# Patient Record
Sex: FEMALE | Race: BLACK OR AFRICAN AMERICAN | Employment: FULL TIME | URBAN - METROPOLITAN AREA
[De-identification: names, ages, dates, MRNs, and addresses within clinical notes are randomized per-mention and may not be internally consistent; named-entity substitution may affect disease eponyms.]

---

## 2021-06-23 ENCOUNTER — HOSPITAL ENCOUNTER (OUTPATIENT)
Dept: NUTRITION | Age: 21
Discharge: HOME | End: 2021-06-23
Payer: MEDICAID

## 2021-06-23 PROCEDURE — 97802 MEDICAL NUTRITION INDIV IN: CPT

## 2021-06-23 NOTE — PROGRESS NOTES
[address] [place] [address]  [address] [facility], [id number] [place] [address]  Phone: [phone] Fax: [phone]   Nutrition Assessment  Medical Nutrition Therapy   Outpatient Initial Evaluation         Patient Name: Ashanti Callahan : 2000   Treatment Diagnosis: Morbid Obesity due to excess calories, BMI 40-44.9    Referral Source: Dwight Rodriguez* Start of Care [facility]): 2021     Gender: female Age: 22 y.o. Ht: 67 in Wt:  260 lb  kg   BMI: 40.7 BMR   Male  BMR Female 0     Past Medical History:  High Blood Pressure     Pertinent Medications:   Includes: [place]      Biochemical Data:        Assessment:   Patient is present today to learn about nutrition for weight loss. Patient reports that she has gained around 100 pounds over the past 2 years. Patient is scheduled to have bariatric surgery in about 6 months and is required to see RD 1 time per month. Patient believes that her weight gain began around the time she met her fiance. Patient was consuming more food and eating out at restaurants more frequently. Patient does not have an exercise routine. Food & Nutrition: 24 Hour Recall:  Breakfast: skips  Lunch: pizza with mushrooms and steak   Dinner: same as lunch   Drinks: water, juice   Snacks: vanilla wafers, chips, cakes        Estimate Needs   Calories: 1800  Protein: 113 Carbs: 203 Fat: 60   Kcal/day  g/day  g/day  g/day        percent: 25  45  30               Nutrition Diagnosis Obesity related to excessive carbohydrate intake as evidenced by 24 hour recall of carbohydrate dense foods (pizza, cakes, cookies, juice). Nutrition Intervention &  Education: Educated patient on the need for a consistent carbohydrate intake related to weight loss. Educated patient on nutrition label reading, emphasizing carbohydrates and serving size.  Educated patient on protein sources, encouraged patient to incorporate mostly lean protein source with all carbohydrates. Encouraged patient to exercise 150 minutes per week. Handouts Provided: [x]  Carbohydrates  [x]  Protein  [x]  Non-starchy Vegatbles  [x]  Food Label  [x]  Meal and Snack Ideas  []  Food Journals []  Diabetes  []  Cholesterol  []  Sodium  [x]  Gen Nutr Guidelines  []  SBGM Guidelines  []  Others:   Information Reviewed with: Patient    Readiness to Change Stage: []  Pre-contemplative    [x]  Contemplative  []  Preparation               []  Action                  []  Maintenance   Potential Barriers to Learning: []  Decline in memory    []  Language barrier   []  Other:  []  Emotional                  []  Limited mobility  [x]  Lack of motivation     [] Vision, hearing or cognitive impairment   Expected Compliance: Fair      Nutritional Goal - To promote lifestyle changes to result in:    [x]  Weight loss  []  Improved diabetic control  []  Decreased cholesterol levels  []  Decreased blood pressure  []  Weight maintenance []  Preventing any interactions associated with food allergies  []  Adequate weight gain toward goal weight  []  Other:        Patient Goals:   1. Patient will consume three meals per day 4-5 hours apart. 2. Patient will include a protein source at all meals and snacks. 3. Patient will walk dog for 45 minutes at least 4 days per week.       Dietitian Signature: Iram Segura RD Date: 6/23/2021   Follow-up: 7-19-21 @ 1:30 Time: 2:57 PM

## 2021-07-19 ENCOUNTER — HOSPITAL ENCOUNTER (OUTPATIENT)
Dept: NUTRITION | Age: 21
Discharge: HOME | End: 2021-07-19
Payer: MEDICAID

## 2021-07-19 PROCEDURE — 97803 MED NUTRITION INDIV SUBSEQ: CPT

## 2021-07-21 NOTE — PROGRESS NOTES
NUTRITION  FOLLOW-UP TREATMENT NOTE  Patient Name: Linda Nunez         Date: 2021  : 2000    YES/NO Patient  Verified  Diagnosis:Morbid Obesity due to excess calories, BMI 40-44.9   In time:  2:00             Out time:   2:30   Total Treatment Time (min):   30     SUBJECTIVE/ASSESSMENT    Current Wt: 260 Previous Wt: 260 Wt Change: 0     Initial Wt: 260 Total Wt change: 0 Height: 67     Changes in medication or medical history? Any new allergies, surgeries or procedures? NO    If yes, update Summary List               Nutrition Diagnosis        Diagnosis Status:   Obesity related to excessive carbohydrate intake as evidenced by 24 hour recall of carbohydrate dense foods (pizza, cakes, cookies, juice). []  Improved [x]  No Change    []  Declined        Nutrition Monitoring and Evaluation: Patient reports that she has not progressed as she had hoped. Patient attempted to eat breakfast a few times but reports that she just \"forgot\" to continue eating it. Patient reports that some of them meals that she enjoys \"can't have vegetables\"; example: had 2 hotdogs on buns with french fries last night. Patient reports that she continues to mostly snack on carbohydrates (vanilla wafers, raisins, rice krispies). Nutrition Prescription and  Intervention Educated patient on the need for a consistent carbohydrate intake related to weight loss. Educated patient on nutrition label reading, emphasizing carbohydrates and serving size. Educated patient on protein sources, encouraged patient to incorporate mostly lean protein source with all carbohydrates. Encouraged patient to exercise 150 minutes per week.          Patient Education:  [x]  Review current plan with patient   []  Other:    Handouts/  Information Provided: []  Carbohydrates  []  Protein  []  Fiber  []  Serving Sizes  []  Fluids  []  General guidelines []  Diabetes  []  Cholesterol  []  Sodium  []  SBGM  []  Food Journals  []  Others: Patient Goals  Continued:   1. Patient will consume three meals per day 4-5 hours apart. 2. Patient will include a protein source at all meals and snacks. 3. Patient will walk dog for 45 minutes at least 4 days per week.         PLAN    []  Continue on current plan []  Follow-up PRN   []  Discharge due to :    [x]  Next appt: 8-16-21 @ 1:30     Dietitian: Angel Dakins, RD    Date: 7/21/2021 Time: 10:42 AM

## 2021-08-16 ENCOUNTER — HOSPITAL ENCOUNTER (OUTPATIENT)
Dept: NUTRITION | Age: 21
Discharge: HOME | End: 2021-08-16
Payer: MEDICAID

## 2021-08-16 PROCEDURE — 97803 MED NUTRITION INDIV SUBSEQ: CPT

## 2021-08-17 NOTE — PROGRESS NOTES
NUTRITION  FOLLOW-UP TREATMENT NOTE  Patient Name: Mirian Veliz         Date: 2021  : 2000    YES/NO Patient  Verified  Diagnosis:Morbid Obesity due to excess calories, BMI 40-44.9   In time:  2:00             Out time:   2:30   Total Treatment Time (min):   30     SUBJECTIVE/ASSESSMENT    Current Wt: 260 Previous Wt: 260 Wt Change: 0     Initial Wt: 260 Total Wt change: 0 Height: 67     Changes in medication or medical history? Any new allergies, surgeries or procedures? NO    If yes, update Summary List               Nutrition Diagnosis        Diagnosis Status:   Obesity related to excessive carbohydrate intake as evidenced by 24 hour recall of carbohydrate dense foods (pizza, cakes, cookies, juice). []  Improved [x]  No Change    []  Declined        Nutrition Monitoring and Evaluation: Patient reports that her clothes are looser and feels that she is losing weight. Patient reports that she has been more consistent with eating breakfast (having granola bar or grapes). Patient continues to consume carbohydrate dense snacks and meals. Nutrition Prescription and  Intervention Continue. .. Educated patient on the need for a consistent carbohydrate intake related to weight loss. Educated patient on nutrition label reading, emphasizing carbohydrates and serving size. Educated patient on protein sources, encouraged patient to incorporate mostly lean protein source with all carbohydrates. Encouraged patient to exercise 150 minutes per week. Patient Education:  [x]  Review current plan with patient   []  Other:    Handouts/  Information Provided: []  Carbohydrates  []  Protein  []  Fiber  []  Serving Sizes  []  Fluids  []  General guidelines []  Diabetes  []  Cholesterol  []  Sodium  []  SBGM  []  Food Journals  []  Others:        Patient Goals  Continued:   1. Patient will consume three meals per day 4-5 hours apart.    2. Patient will include a protein source at all meals and snacks.           PLAN    []  Continue on current plan []  Follow-up PRN   []  Discharge due to :    [x]  Next appt: 9-13-21 @ 1:30     Dietitian: Rakel Esqueda RD    Date: 8/17/2021 Time: 10:42 AM

## 2021-09-13 ENCOUNTER — HOSPITAL ENCOUNTER (OUTPATIENT)
Dept: NUTRITION | Age: 21
Discharge: HOME | End: 2021-09-13
Payer: MEDICAID

## 2021-09-13 PROCEDURE — 97803 MED NUTRITION INDIV SUBSEQ: CPT

## 2021-09-15 NOTE — PROGRESS NOTES
NUTRITION  FOLLOW-UP TREATMENT NOTE  Patient Name: Inessa Perez         Date: 9/15/2021  : 2000    YES Patient  Verified  Diagnosis:Morbid Obesity due to excess calories, BMI 40-44.9   In time:  2:00             Out time:   2:30   Total Treatment Time (min):   30     SUBJECTIVE/ASSESSMENT    Current Wt: 255 Previous Wt: 260 Wt Change: -5     Initial Wt: 260 Total Wt change: -5 Height: 67     Changes in medication or medical history? Any new allergies, surgeries or procedures? NO    If yes, update Summary List               Nutrition Diagnosis        Diagnosis Status:   Obesity related to excessive carbohydrate intake as evidenced by 24 hour recall of carbohydrate dense foods (pizza, cakes, cookies, juice). []  Improved [x]  No Change    []  Declined        Nutrition Monitoring and Evaluation: Patient has been unable to meet exercise goal since first session with RD. Patient reports that she has now purchased a stationary bike for her home and is hoping to start using it 3 days per week. Patient continues to consume carbohydrate dense meals and snacks. Nutrition Prescription and  Intervention Continue. .. Educated patient on the need for a consistent carbohydrate intake related to weight loss. Educated patient on nutrition label reading, emphasizing carbohydrates and serving size. Educated patient on protein sources, encouraged patient to incorporate mostly lean protein source with all carbohydrates. Encouraged patient to exercise 150 minutes per week. Patient Education:  [x]  Review current plan with patient   []  Other:    Handouts/  Information Provided: []  Carbohydrates  []  Protein  []  Fiber  []  Serving Sizes  []  Fluids  []  General guidelines []  Diabetes  []  Cholesterol  []  Sodium  []  SBGM  []  Food Journals  []  Others:        Patient Goals 1. Patient will ride stationary bike for 30 minutes at least 3 days per week.    2. Patient will include a protein at all meals and snacks.           PLAN    []  Continue on current plan []  Follow-up PRN   []  Discharge due to :    [x]  Next appt: 10-11-21 @ 1:30     Dietitian: Lindy Putnam RD    Date: 9/15/2021 Time: 10:42 AM

## 2021-10-18 ENCOUNTER — HOSPITAL ENCOUNTER (OUTPATIENT)
Dept: NUTRITION | Age: 21
Discharge: HOME | End: 2021-10-18
Payer: MEDICAID

## 2021-10-18 PROCEDURE — 97803 MED NUTRITION INDIV SUBSEQ: CPT

## 2021-10-20 NOTE — PROGRESS NOTES
NUTRITION  FOLLOW-UP TREATMENT NOTE  Patient Name: Kamilah Pemberton         Date: 10/20/2021  : 2000    YES Patient  Verified  Diagnosis:Morbid Obesity due to excess calories, BMI 40-44.9   In time:  1:00            Out time:   1:30   Total Treatment Time (min):   30     SUBJECTIVE/ASSESSMENT    Current Wt: 255 Previous Wt: 260 Wt Change: -5     Initial Wt: 260 Total Wt change: -5 Height: 67     Changes in medication or medical history? Any new allergies, surgeries or procedures? NO    If yes, update Summary List               Nutrition Diagnosis        Diagnosis Status:   Obesity related to excessive carbohydrate intake as evidenced by 24 hour recall of carbohydrate dense foods (pizza, cakes, cookies, juice). []  Improved [x]  No Change    []  Declined        Nutrition Monitoring and Evaluation: Patient reports that she has not made any changes to her diet since last visit. Patient continues to consume large portions of carbohydrates at meals. Patient reports that she has been riding her bike for about 30 minutes 2 days per week. 24 Hour Recall:  Breakfast: Special K cereal  Lunch: sandwich, chips  Dinner: Poly cheese steak wrap       Nutrition Prescription and  Intervention Educated patient on portion control using MyPlate method. Encouraged patient to include proteins at meals and snacks along with carbohydrates. Patient Education:  [x]  Review current plan with patient   []  Other:    Handouts/  Information Provided: []  Carbohydrates  []  Protein  []  Fiber  []  Serving Sizes  []  Fluids  []  General guidelines []  Diabetes  []  Cholesterol  []  Sodium  []  SBGM  []  Food Journals  []  Others:        Patient Goals 1. Patient will include a protein at all meals and snacks.           PLAN    []  Continue on current plan []  Follow-up PRN   []  Discharge due to :    [x]  Next appt: 11-10- @ 1:30     Dietitian: Kamila Jacobson RD    Date: 10/20/2021 Time: 10:42 AM

## 2021-11-10 ENCOUNTER — HOSPITAL ENCOUNTER (OUTPATIENT)
Dept: NUTRITION | Age: 21
Discharge: HOME | End: 2021-11-10
Payer: MEDICAID

## 2021-11-10 PROCEDURE — 97803 MED NUTRITION INDIV SUBSEQ: CPT

## 2021-11-10 NOTE — PROGRESS NOTES
NUTRITION  FOLLOW-UP TREATMENT NOTE  Patient Name: Justin Cancel         Date: 11/10/2021  : 2000    YES Patient  Verified  Diagnosis:Morbid Obesity due to excess calories, BMI 40-44.9   In time:  1:30            Out time:   2:00   Total Treatment Time (min):   30     SUBJECTIVE/ASSESSMENT    Current Wt: 255 Previous Wt: 255 Wt Change: 0     Initial Wt: 260 Total Wt change: -5 Height: 67     Changes in medication or medical history? Any new allergies, surgeries or procedures? NO    If yes, update Summary List               Nutrition Diagnosis        Diagnosis Status:   Obesity related to excessive carbohydrate intake as evidenced by 24 hour recall of carbohydrate dense foods (pizza, cakes, cookies, juice). []  Improved [x]  No Change    []  Declined        Nutrition Monitoring and Evaluation: Patient reports that she has finally made an effort to control carbohydrate portions by using smaller plate at meals. Patient also reports that she is eating out more at restaurants and is making an effort to order vegetables with entree or order salad. Nutrition Prescription and  Intervention Educated patient on the need for a consistent carbohydrate intake related to weight loss. Educated patient on nutrition label reading, emphasizing carbohydrates and serving size. Educated patient on protein sources, encouraged patient to incorporate mostly lean protein source with all carbohydrates. Encouraged patient to exercise 150 minutes per week. Patient Education:  [x]  Review current plan with patient   []  Other:    Handouts/  Information Provided: []  Carbohydrates  []  Protein  []  Fiber  []  Serving Sizes  []  Fluids  []  General guidelines []  Diabetes  []  Cholesterol  []  Sodium  []  SBGM  []  Food Journals  []  Others:        Patient Goals 1. Patient will include a protein at all meals and snacks.           PLAN    []  Continue on current plan [x]  Follow-up PRN   []  Discharge due to :    []  Next appt:      Dietitian: Gina Funes RD    Date: 11/10/2021 Time: 10:42 AM

## 2022-01-31 ENCOUNTER — HOSPITAL ENCOUNTER (OUTPATIENT)
Dept: LAB | Age: 22
Discharge: HOME | End: 2022-01-31

## 2022-01-31 ENCOUNTER — OFFICE VISIT (OUTPATIENT)
Dept: SURGERY | Age: 22
End: 2022-01-31
Payer: MEDICAID

## 2022-01-31 VITALS
HEART RATE: 80 BPM | TEMPERATURE: 98.2 F | HEIGHT: 67 IN | DIASTOLIC BLOOD PRESSURE: 76 MMHG | BODY MASS INDEX: 41.42 KG/M2 | SYSTOLIC BLOOD PRESSURE: 146 MMHG | OXYGEN SATURATION: 99 % | WEIGHT: 263.9 LBS | RESPIRATION RATE: 16 BRPM

## 2022-01-31 DIAGNOSIS — E66.01 MORBID OBESITY WITH BODY MASS INDEX (BMI) OF 40.0 TO 49.9 (HCC): ICD-10-CM

## 2022-01-31 DIAGNOSIS — E66.01 MORBID OBESITY (HCC): Primary | ICD-10-CM

## 2022-01-31 DIAGNOSIS — Z01.818 PRE-OP EVALUATION: ICD-10-CM

## 2022-01-31 DIAGNOSIS — D64.9 ANEMIA, UNSPECIFIED TYPE: ICD-10-CM

## 2022-01-31 DIAGNOSIS — G47.30 SLEEP DISORDER BREATHING: ICD-10-CM

## 2022-01-31 DIAGNOSIS — K30 FUNCTIONAL DYSPEPSIA: ICD-10-CM

## 2022-01-31 DIAGNOSIS — I10 PRIMARY HYPERTENSION: ICD-10-CM

## 2022-01-31 LAB — SENTARA SPECIMEN COL,SENBCF: NORMAL

## 2022-01-31 PROCEDURE — 99205 OFFICE O/P NEW HI 60 MIN: CPT | Performed by: SPECIALIST

## 2022-01-31 PROCEDURE — 99001 SPECIMEN HANDLING PT-LAB: CPT

## 2022-01-31 NOTE — PROGRESS NOTES
Godfrey Adair presents today for   Chief Complaint   Patient presents with    New Patient       Is someone accompanying this pt? no    Is the patient using any DME equipment during OV? no    Coordination of Care:  1. Have you been to the ER, urgent care clinic since your last visit? Hospitalized since your last visit? no    2. Have you seen or consulted any other health care providers outside of the [address] [place] [address] since your last visit? Include any pap smears or colon screening.  no

## 2022-01-31 NOTE — PATIENT INSTRUCTIONS

## 2022-01-31 NOTE — PROGRESS NOTES
Bariatric Surgery Consultation  Transfer from Kushal's office    Subjective: The patient is a 25 y.o. obese female with a Body mass index is 41.33 kg/m². .  The patient is currently her heaviest weight for the past several years. she has been overweight since childhood. she has been considering surgery since last year. she desires surgery at this time because of multiple health concerns and their lifestyle issues which are hindered by their weight. she has been referred by MELANIE Soto for evaluation and treatment of their obesity via surgical intervention. Godfrey Adair has tried multiple diets in her lifetime most recently tried physician supervised, behavior modification and unsupervised diets    Bariatric comorbidities present are   Patient Active Problem List   Diagnosis Code    [facility]bid obesity [facility] E66.01    Morbid obesity with body mass index (BMI) of 40.0 to 49.9 ([facility]) E66.01    Functional dyspepsia K30    Sleep disorder breathing G47.30    Hypertension I10       The patient is considering laparoscopic sleeve gastrectomy for surgical weight loss due to their ineffective progress with medical forms of weight loss and the urging of their physician who cares for their primary medical issues. The patient  now presents  for consideration for weight loss surgery understanding the benefits of this over a medical approach of weight loss as was discussed in our presentation on weight loss surgery. They have discussed their plans both with their family and primary care physician who is in support of their pursuit of such. The patient has had no health issues as of late and denies and gastrointestinal disturbances other than what is outlined below in their review of symptoms. All of their prior evaluations available by both their PCP's and specialists physicians have been reviewed today either in the Care Everywhere portal or scanned under the media tab.     I have spent a large portion of my initial consultation today reviewing the patients current dietary habits which have contributed to their health issues and obesity. I have suggested to them personally a dietary regimen that they can initiate now to help with their status as it pertains to their weight. They understand that the most important aspect of their journey through their weight loss endeavor will be their adherence to a new lifestyle of healthy eating behavior. They also understand that an adherence to an exercise program will not only help with weight loss but is ultimately important in weight maintenance. The patients goal weight is 172 lb.      Patient Active Problem List    Diagnosis Date Noted    Morbid obesity ([address]     Morbid obesity with body mass index (BMI) of 40.0 to 49.9 ([facility])     Functional dyspepsia     Sleep disorder breathing     Hypertension      Past Surgical History:   Procedure Laterality Date    HX WISDOM TEETH EXTRACTION        Social History     Tobacco Use    Smoking status: Never Smoker    Smokeless tobacco: Never Used   Substance Use Topics    Alcohol use: Yes     Comment: once a month      Family History   Problem Relation Age of Onset    Hypertension Mother     Hypertension Father     Diabetes Father         No Known Allergies     Review of Systems:     General - No history or complaints of unexpected fever, chills, or weight loss  Head/Neck - No history or complaints of headache, diplopia, dysphagia, hearing loss  Cardiac - No history or complaints of chest pain, palpitations, murmur, or shortness of breath  Pulmonary - No history or complaints of shortness of breath, productive cough, hemoptysis  Gastrointestinal - (+) reflux, no abdominal pain, obstipation/constipation or blood per rectum  Genitourinary - No history or complaints of hematuria/dysuria, stress urinary incontinence symptoms, or renal lithiasis  Musculoskeletal - mild joint pain in their knees,  no muscular weakness  Hematologic - No history or complaints of bleeding disorders,  No blood transfusions  Neurologic - No history or complaints of  migraine headaches, seizure activity, syncopal episodes, TIA or stroke  Integumentary - No history or complaints of rashes, abnormal nevi, skin cancer  Gynecological - unremarkable    Objective:     Visit Vitals  BP (!) 146/76 (BP 1 Location: Right lower arm, BP Patient Position: Sitting)   Pulse 80   Temp 98.2 °F (36.8 °C) (Temporal)   Resp 16   Ht 5' 7\" (1.702 m)   Wt 119.7 kg (263 lb 14.4 oz)   SpO2 99%   BMI 41.33 kg/m²       Physical Examination: General appearance - alert, well appearing, and in no distress  Mental status - alert, oriented to person, place, and time  Eyes - pupils equal and reactive, extraocular eye movements intact  Nose - normal and patent, no erythema, discharge or polyps  Mouth - mucous membranes moist, pharynx normal without lesions  Neck - supple, no significant adenopathy  Lymphatics - no palpable lymphadenopathy, no hepatosplenomegaly  Chest - clear to auscultation, no wheezes, rales or rhonchi, symmetric air entry  Heart - normal rate, regular rhythm, normal S1, S2, no murmurs, rubs, clicks or gallops  Abdomen - soft, nontender, nondistended, no masses or organomegaly  Back exam - full range of motion, no tenderness, palpable spasm or pain on motion  Neurological - alert, oriented, normal speech, no focal findings or movement disorder noted  Musculoskeletal - no joint tenderness, deformity or swelling  Extremities - peripheral pulses normal, no pedal edema, no clubbing or cyanosis  Skin - normal coloration and turgor, no rashes, no suspicious skin lesions noted    Labs:       No results found for this or any previous visit (from the past 1440 hour(s)). Assessment:     Morbid obesity with comorbidity    Plan:     laparoscopic sleeve gastrectomy    This is a 25 y.o. female with a BMI of Body mass index is 41.33 kg/m². and the weight-related co-morbidties as noted below. Gita Santizo meets the NIH criteria for bariatric surgery based upon the BMI of Body mass index is 41.33 kg/m². and multiple weight-related co-morbidties. Gita Santizo has elected laparoscopic sleeve gastrectomy as her intervention of choice for treatment of morbid obestiy through surgical means secondary to its safety profile, rapid return to work  and decreases in operative risks over gastric bypass. In the office today, following Fiona's history and physical examination, a 30 minute discussion regarding the anatomic alterations for the laparoscopic sleeve gastrectomy was undertaken. The dietary expectations and the patient and physician dependent factors for success were thoroughly discussed, to include the need for interval follow-up and long-term dietary changes associated with success. The possible complications of the sleeve gastrectomy  were also discussed, to include;death, DVT/PE, staple line leak, bleeding, stricture formation, infection, nutritional deficiencies and sleeve dilation. Specific weight related outcomes for success were also discussed with an emphasis on careful and close follow-up with the first year and eating behavior modification as the baseline and cyclical hunger return. The patient expressed an understanding of the above factors, and her questions were answered in their entirety. In addition, the patient attended a 1.5 hour power point seminar regarding obesity, surgical weight loss including, adjustable gastric band, gastric bypass, and sleeve gastrectomy. This discussion contrasted the different surgical techniques, mechanisms of actions and expected outcomes, and surgical and medical risks associated with each procedure. During this seminar, there was a long question and answer session where each questions was answered until there were no additional questions.      Today, the patient had all of her questions answered and desires to proceed with  bariatric surgery initially choosing sleeve gastrectomy as her surgical option. Full consult labs and EKG ordered today. She passed a sleep study in prep for surgery. Secondary Diagnoses:     Dietary Intervention  - The patient is currently scheduled to see or has been followed by a bariatric nutritionist for an attempt at preoperative weight loss as has been dictated by their insurance carrier. They will be assessed at various times during their follow up to evaluate their progress depending on the length of time that is required once again by their carrier. I have explained the importance of preoperative weight loss and the benefits regarding lower surgical risk and also assisting the patient in reaching their weight loss goal.  Finally they understand their is a physiologic benefit from the standpoint of hepatic volume reduction preoperatively. I have reiterated the importance of a low carbohydrate and high protein regimen to achieve their stated goal.     GERD -The patient understands that weight loss surgery is not a guaranteed cure for reflux disease but does understand the benefits that weight loss can have on reflux disease. They also understand that at the time of surgery the gastroesophageal junction will be evaluated for the presence of a diaphragmatic hernia. Hernias will be corrected always with the gastric band and sleeve gastrectomy procedures, but only on a case by case basis with the gastric bypass if it prevents our ability to perform the operation at hand, or if I feel that they would benefit long term with correction of this issue. The patient also understands that neither weight loss surgery nor repair of a diaphragmatic hernia repair guarantees the complete cessation of the disease. They also understand there is a possibility of recurrence with a simple crural repair as is performed with these procedures. They understand they may have to continue their medications in the postoperative period.  They have a good understanding that the gastric bypass procedure is better suited to total resolution of this issue and that neither the Lap Band nor sleeve gastrectomy is considered a curative procedure as it pertains to this diagnosis. Hypertension - The patient has a clear understanding of how weight loss improves hypertension as a whole, but also they understand that there is a significant genetic component to this disease process. We will monitor the patients blood pressure while in the hospital and the plan would be to continue those medications postoperatively.  If a diuretic is being used we will stop them on discharge to prevent dehydration particularly with the sleeve gastrectomy and the gastric bypass procedures.  They will be instructed to monitor their blood pressure postoperatively while at home and notify their primary care physician in the event of any significantly high or uncharacteristic readings.     Signed By: Alisa Jo MD     January 31, 2022

## 2022-02-01 LAB
A-G RATIO,AGRAT: 1.7 RATIO (ref 1.1–2.6)
ALBUMIN SERPL-MCNC: 4.5 G/DL (ref 3.5–5)
ALP SERPL-CCNC: 59 U/L (ref 25–115)
ALT SERPL-CCNC: 19 U/L (ref 5–40)
ANION GAP SERPL CALC-SCNC: 14 MMOL/L (ref 3–15)
AST SERPL W P-5'-P-CCNC: 16 U/L (ref 10–37)
BILIRUB SERPL-MCNC: 0.2 MG/DL (ref 0.2–1.2)
BUN SERPL-MCNC: 10 MG/DL (ref 6–22)
CALCIUM SERPL-MCNC: 9.6 MG/DL (ref 8.4–10.5)
CHLORIDE SERPL-SCNC: 103 MMOL/L (ref 98–110)
CO2 SERPL-SCNC: 25 MMOL/L (ref 20–32)
CREAT SERPL-MCNC: 0.8 MG/DL (ref 0.5–1.2)
ERYTHROCYTE [DISTWIDTH] IN BLOOD BY AUTOMATED COUNT: 14.2 % (ref 10–15.5)
FERRITIN SERPL-MCNC: 35 NG/ML (ref 10–291)
GFRAA, 66117: >60
GFRNA, 66118: >60
GLOBULIN,GLOB: 2.6 G/DL (ref 2–4)
GLUCOSE SERPL-MCNC: 82 MG/DL (ref 70–99)
HCT VFR BLD AUTO: 41.1 % (ref 35.1–46.5)
HGB BLD-MCNC: 12.9 G/DL (ref 11.7–15.5)
IRON,IRN: 62 MCG/DL (ref 30–160)
MCH RBC QN AUTO: 28 PG (ref 26–34)
MCHC RBC AUTO-ENTMCNC: 31 G/DL (ref 31–36)
MCV RBC AUTO: 88 FL (ref 80–99)
PLATELET # BLD AUTO: 276 K/UL (ref 140–440)
PMV BLD AUTO: 11.4 FL (ref 9–13)
POTASSIUM SERPL-SCNC: 4.8 MMOL/L (ref 3.5–5.5)
PROT SERPL-MCNC: 7.1 G/DL (ref 6.4–8.3)
RBC # BLD AUTO: 4.69 M/UL (ref 3.8–5.2)
SODIUM SERPL-SCNC: 142 MMOL/L (ref 133–145)
T4 FREE SERPL-MCNC: 1.1 NG/DL (ref 0.9–1.8)
TSH SERPL DL<=0.005 MIU/L-ACNC: 2.17 MCU/ML (ref 0.27–4.2)
WBC # BLD AUTO: 6.1 K/UL (ref 4–11)

## 2022-03-02 ENCOUNTER — HOSPITAL ENCOUNTER (OUTPATIENT)
Age: 22
Setting detail: OUTPATIENT SURGERY
Discharge: HOME | End: 2022-03-02
Attending: SPECIALIST | Admitting: SPECIALIST
Payer: MEDICAID

## 2022-03-02 ENCOUNTER — APPOINTMENT (OUTPATIENT)
Dept: SURGERY | Age: 22
End: 2022-03-02

## 2022-03-02 ENCOUNTER — APPOINTMENT (OUTPATIENT)
Dept: GENERAL RADIOLOGY | Age: 22
End: 2022-03-02
Attending: SPECIALIST
Payer: MEDICAID

## 2022-03-02 VITALS
OXYGEN SATURATION: 99 % | SYSTOLIC BLOOD PRESSURE: 164 MMHG | WEIGHT: 265.9 LBS | RESPIRATION RATE: 18 BRPM | BODY MASS INDEX: 41.73 KG/M2 | DIASTOLIC BLOOD PRESSURE: 90 MMHG | HEIGHT: 67 IN | TEMPERATURE: 98.8 F | HEART RATE: 81 BPM

## 2022-03-02 DIAGNOSIS — E66.01 MORBID OBESITY (HCC): ICD-10-CM

## 2022-03-02 DIAGNOSIS — K30 FUNCTIONAL DYSPEPSIA: ICD-10-CM

## 2022-03-02 PROCEDURE — 74240 X-RAY XM UPR GI TRC 1CNTRST: CPT | Performed by: SPECIALIST

## 2022-03-02 PROCEDURE — 74240 X-RAY XM UPR GI TRC 1CNTRST: CPT

## 2022-03-02 PROCEDURE — 76040000019: Performed by: SPECIALIST

## 2022-03-02 PROCEDURE — 74011000250 HC RX REV CODE- 250: Performed by: SPECIALIST

## 2022-03-02 NOTE — PROCEDURES
Patient:Fiona Packer   : 2000  Medical Record Number:[id number]            PREPROCEDURE DIAGNOSIS: This patient is preoperative for laparoscopic sleeve gastrectomy procedure with a history of  reflux disease. POSTPROCEDURE DIAGNOSIS: This patient is preoperative for laparoscopic sleeve gastrectomy procedure with a history of  reflux disease. PROCEDURES PERFORMED: Upper GI study with barium. ESTIMATED BLOOD LOSS: None. SPECIMENS: None. STATEMENT OF MEDICAL NECESSITY: The patient is a patient with a  longstanding history of obesity. They are now considering the laparoscopic sleeve gastrectomy procedure as a means of surgical weight control and due to their history of reflux disease and are being assessed preoperatively for such. DESCRIPTION OF PROCEDURE: The patient was brought to the fluoroscopy unit and  was given thin barium. On swallowing of barium, they were noted to have  normal peristalsis of their esophagus. They had prompt filling of distal  esophagus with tapering into the gastroesophageal junction. There was no evidence of a hiatal hernia present. Contrast then filled the gastric cardia, fundus,body and pre pyloric region with no abnormalities noted. Contrast then exited the pylorus in normal fashion. No obstruction was noted. There was no evidence of reflux noted.     (normal anatomy)    Sarah Huffman MD

## 2022-05-13 ENCOUNTER — TELEPHONE (OUTPATIENT)
Dept: SURGERY | Age: 22
End: 2022-05-13

## 2022-05-13 NOTE — TELEPHONE ENCOUNTER
A telephone call was placed to patient to assess habits 30 days prior to having surgery. Diabetes:  Have your blood sugar levels been well controlled and significantly under 200 the entire month prior to surgery? If not, you need to let us know and see your family physician for management. Patient stated she is not diabetic. Hypertension:  Have your blood pressures been well-controlled with a systolic pressure lower than 223 and a diastolic pressure lower than 100? Patient aware and said their blood pressure is well controlled with their medications. Steroids (oral or injections of any kind):  Have you taken any steroid injections in your back, knee, foot, or any part of your body? Have you taken steroids for any type of respiratory virus, asthma, or COPD? Patient is aware and denied steroid use. Non-steroidal Anti-inflammatory medications:  Have you taken Motrin, Aleve, Naproxen, Aspirin, Celebrex, or any other over-the-counter or prescription NSAID? Patient is aware and denied NSAID use. Recreational Drug Use (tobacco, vaping with tobacco, marijuana/edibles, cocaine, or any other recreational drugs):  Have you had any nicotine products to include cigarettes, vaping, nicotine patches, and/or nicotine gum? Have you smoked marijuana and/or ate edibles containing marijuana? Have you consumed any cocaine and/or any other recreational drugs? Patient is aware and denied any recreational drug use. Medication:  Have you had any significant changes to any medication? Patient is aware and denied changes to any medications.

## 2022-05-16 ENCOUNTER — HOSPITAL ENCOUNTER (OUTPATIENT)
Dept: LAB | Age: 22
Discharge: HOME | End: 2022-05-16

## 2022-05-16 ENCOUNTER — OFFICE VISIT (OUTPATIENT)
Dept: SURGERY | Age: 22
End: 2022-05-16
Payer: MEDICAID

## 2022-05-16 ENCOUNTER — HOSPITAL ENCOUNTER (OUTPATIENT)
Dept: BARIATRICS/WEIGHT MGMT | Age: 22
Discharge: HOME | End: 2022-05-16

## 2022-05-16 ENCOUNTER — HOSPITAL ENCOUNTER (OUTPATIENT)
Dept: PREADMISSION TESTING | Age: 22
Discharge: HOME | End: 2022-05-16
Payer: MEDICAID

## 2022-05-16 ENCOUNTER — NURSE NAVIGATOR (OUTPATIENT)
Dept: SURGERY | Age: 22
End: 2022-05-16

## 2022-05-16 VITALS
OXYGEN SATURATION: 99 % | WEIGHT: 267.8 LBS | HEIGHT: 67 IN | RESPIRATION RATE: 16 BRPM | TEMPERATURE: 97.3 F | DIASTOLIC BLOOD PRESSURE: 85 MMHG | SYSTOLIC BLOOD PRESSURE: 152 MMHG | BODY MASS INDEX: 42.03 KG/M2 | HEART RATE: 99 BPM

## 2022-05-16 DIAGNOSIS — Z01.812 BLOOD TESTS PRIOR TO TREATMENT OR PROCEDURE: ICD-10-CM

## 2022-05-16 DIAGNOSIS — E66.01 MORBID OBESITY WITH BODY MASS INDEX (BMI) OF 40.0 TO 49.9 (HCC): ICD-10-CM

## 2022-05-16 DIAGNOSIS — G89.18 POST-OP PAIN: ICD-10-CM

## 2022-05-16 DIAGNOSIS — E66.01 MORBID OBESITY (HCC): Primary | ICD-10-CM

## 2022-05-16 DIAGNOSIS — I10 PRIMARY HYPERTENSION: ICD-10-CM

## 2022-05-16 DIAGNOSIS — E66.01 MORBID OBESITY (HCC): ICD-10-CM

## 2022-05-16 DIAGNOSIS — K30 FUNCTIONAL DYSPEPSIA: ICD-10-CM

## 2022-05-16 DIAGNOSIS — E28.2 PCOS (POLYCYSTIC OVARIAN SYNDROME): ICD-10-CM

## 2022-05-16 LAB
ATRIAL RATE: 84 BPM
CALCULATED P AXIS, ECG09: 57 DEGREES
CALCULATED R AXIS, ECG10: 43 DEGREES
CALCULATED T AXIS, ECG11: 5 DEGREES
DIAGNOSIS, 93000: NORMAL
P-R INTERVAL, ECG05: 180 MS
Q-T INTERVAL, ECG07: 366 MS
QRS DURATION, ECG06: 98 MS
QTC CALCULATION (BEZET), ECG08: 432 MS
SENTARA SPECIMEN COL,SENBCF: NORMAL
VENTRICULAR RATE, ECG03: 84 BPM

## 2022-05-16 PROCEDURE — 99001 SPECIMEN HANDLING PT-LAB: CPT

## 2022-05-16 PROCEDURE — 99215 OFFICE O/P EST HI 40 MIN: CPT | Performed by: SPECIALIST

## 2022-05-16 PROCEDURE — 93005 ELECTROCARDIOGRAM TRACING: CPT

## 2022-05-16 RX ORDER — ENOXAPARIN SODIUM 100 MG/ML
40 INJECTION SUBCUTANEOUS EVERY 12 HOURS
Qty: 20 EACH | Refills: 0 | Status: SHIPPED | OUTPATIENT
Start: 2022-05-16 | End: 2022-05-26

## 2022-05-16 RX ORDER — ONDANSETRON 8 MG/1
8 TABLET, ORALLY DISINTEGRATING ORAL
Qty: 30 TABLET | Refills: 0 | Status: SHIPPED | OUTPATIENT
Start: 2022-05-16 | End: 2022-09-13 | Stop reason: ALTCHOICE

## 2022-05-16 RX ORDER — OXYCODONE AND ACETAMINOPHEN 5; 325 MG/1; MG/1
1 TABLET ORAL
Qty: 30 TABLET | Refills: 0 | Status: SHIPPED | OUTPATIENT
Start: 2022-05-16 | End: 2022-05-21

## 2022-05-16 NOTE — PROGRESS NOTES
Sleeve Gastrectomy - History and Physical    Subjective: The patient is a 25 y.o. obese female with a Body mass index is 41.94 kg/m². .   she presents now to review their work up to date to see if they are a candidate for surgery and whether or not to proceed with the previously requested procedure. Bariatric comorbidities continue to include:   Patient Active Problem List   Diagnosis Code    Morbid obesity ([facility] [address] E66.01    Morbid obesity with body mass index (BMI) of 40.0 to 49.9 ([facility]) E66.01    Functional dyspepsia K30    Sleep disorder breathing G47.30    Hypertension I10    PCOS (polycystic ovarian syndrome) E28.2       They have been generally well prior to this visit and have had no recent significant illnesses. The patient has had no gastrointestinal issues that would preclude them from proceeding with the surgery they have chosen. Vlad Renee has recently tried a preoperative weight loss program  in addition to seeing a bariatric nutritionist preoperatively. We have discussed on at least one other occasion about the various types of surgical weight loss procedures and they have considered these options after our initial consultation. We have once again discussed these procedures in detail and they have now decided on a surgical procedure. They present today to discuss this and confirm that their evaluation pre operatively is acceptable to continue with surgery. The patient desires laparoscopic sleeve gastrectomy for surgical weight loss. The patients goal weight is 172 lb. (this represents a BMI of 28)    These goals are consistent with expected outcomes of their desired operation. her Medical goals are resolution of these health issues. Since the patient's original consult 4 months ago they have been seen by their GYN for a new diagnosis of PCOS (no medication as of today's visit).       They have been on no steroids in any form.     She has gained 4 lbs since the consult in late Jan      UGI was normal     Patient Active Problem List    Diagnosis Date Noted    PCOS (polycystic ovarian syndrome)     Morbid obesity ([facility] [address]     Morbid obesity with body mass index (BMI) of 40.0 to 49.9 ([facility])     Functional dyspepsia     Sleep disorder breathing     Hypertension      Past Surgical History:   Procedure Laterality Date    HX WISDOM TEETH EXTRACTION        Social History     Tobacco Use    Smoking status: Never Smoker    Smokeless tobacco: Never Used   Substance Use Topics    Alcohol use: Yes     Comment: once a month      Family History   Problem Relation Age of Onset    Hypertension Mother     Hypertension Father     Diabetes Father       Current Outpatient Medications   Medication Sig Dispense Refill    oxyCODONE-acetaminophen (PERCOCET) 5-325 mg per tablet Take 1 Tablet by mouth every four (4) hours as needed for Pain for up to 5 days. Max Daily Amount: 6 Tablets. 30 Tablet 0    enoxaparin (LOVENOX) 40 mg/0.4 mL 0.4 mL by SubCUTAneous route every twelve (12) hours every twelve (12) hours for 10 days. Indications: deep vein thrombosis prevention (Patient not taking: Reported on 5/16/2022) 20 Each 0    ondansetron (ZOFRAN ODT) 8 mg disintegrating tablet Take 1 Tablet by mouth every eight (8) hours as needed for Nausea or Vomiting.  (Patient not taking: Reported on 5/16/2022) 30 Tablet 0     Allergies   Allergen Reactions    Pollen Extracts Other (comments)        Review of Systems:     General - No history or complaints of unexpected fever, chills, or weight loss  Head/Neck - No history or complaints of headache, diplopia, dysphagia, hearing loss  Cardiac - No history or complaints of chest pain, palpitations, murmur, or shortness of breath  Pulmonary - No history or complaints of shortness of breath, productive cough, hemoptysis  Gastrointestinal - No history or complaints of reflux,  abdominal pain, obstipation/constipation, blood per rectum  Genitourinary - No history or complaints of hematuria/dysuria, stress urinary incontinence symptoms, or renal lithiasis  Musculoskeletal - No history or complaints of joint pain or muscular weakness  Hematologic - No history or complaints of bleeding disorders, blood transfusions, sickle cell anemia  Neurologic - No history or complaints of  migraine headaches, seizure activity, syncopal episodes, TIA or stroke  Integumentary - No history or complaints of rashes, abnormal nevi, skin cancer  Gynecological - unremarkable     Objective:     Visit Vitals  BP (!) 152/85   Pulse 99   Temp 97.3 °F (36.3 °C)   Resp 16   Ht 5' 7\" (1.702 m)   Wt 121.5 kg (267 lb 12.8 oz)   SpO2 99%   BMI 41.94 kg/m²     Physical Examination: General appearance - alert, well appearing, and in no distress  Mental status - alert, oriented to person, place, and time  Eyes - pupils equal and reactive, extraocular eye movements intact  Nose - normal and patent, no erythema, discharge or polyps  Mouth - mucous membranes moist, pharynx normal without lesions  Neck - supple, no significant adenopathy  Lymphatics - no palpable lymphadenopathy, no hepatosplenomegaly  Chest - clear to auscultation, no wheezes, rales or rhonchi, symmetric air entry  Heart - normal rate, regular rhythm, normal S1, S2, no murmurs, rubs, clicks or gallops  Abdomen - soft, nontender, nondistended, no masses or organomegaly  Back exam - full range of motion, no tenderness, palpable spasm or pain on motion  Neurological - alert, oriented, normal speech, no focal findings or movement disorder noted  Musculoskeletal - no joint tenderness, deformity or swelling  Extremities - peripheral pulses normal, no pedal edema, no clubbing or cyanosis  Skin - normal coloration and turgor, no rashes, no suspicious skin lesions noted    Labs / Preoperative Evaluation:        Recent Results (from the past 1008 hour(s))   EKG, 12 LEAD, INITIAL    Collection Time: 05/16/22  2:13 PM   Result Value Ref Range    Ventricular Rate 84 BPM    Atrial Rate 84 BPM    P-R Interval 180 ms    QRS Duration 98 ms    Q-T Interval 366 ms    QTC Calculation (Bezet) 432 ms    Calculated P Axis 57 degrees    Calculated R Axis 43 degrees    Calculated T Axis 5 degrees    Diagnosis       Normal sinus rhythm  Nonspecific T wave abnormality  Abnormal ECG  No previous ECGs available     [facility] Collection Time: 05/16/22  2:18 PM   Result Value Ref Range    [facility] Specimens collected/sent to [facility]         Assessment:     Morbid obesity with comorbidity    Plan:     laparoscopic sleeve gastrectomy    This is a 25 y.o. female with a BMI of Body mass index is 41.94 kg/m². and the weight-related co-morbidties as noted above. Karel Campa meets the NIH criteria for bariatric surgery based upon the BMI of Body mass index is 41.94 kg/m². and multiple weight-related co-morbidties. Karel Campa has elected laparoscopic sleeve gastrectomy as her intervention of choice for treatment of morbid obestiy through surgical means secondary to its safety profile, rapid return to work  and decreases in operative risks over gastric bypass. In the office today, following Fiona's history and physical examination, a 40 minute discussion regarding the anatomic alterations for the laparoscopic sleeve gastrectomy was undertaken. The dietary expectations and the patient  dependent factors for success were thoroughly discussed, to include the need for interval follow-up and long-term dietary changes associated with success. The possible complications of the sleeve gastrectomy  were also discussed, to include;death, DVT/PE, staple line leak, bleeding, stricture formation, infection, nutritional deficiencies and sleeve dilation. Specific weight related outcomes for success were also discussed with an emphasis on careful and close follow-up with the first year and eating behavior modification as the baseline and cyclical hunger return.   The patient expressed an understanding of the above factors, and her questions were answered in their entirety. In addition, the patient attended a 1.5 hour power point seminar regarding obesity, surgical weight loss including, adjustable gastric band, gastric bypass, and sleeve gastrectomy. This discussion contrasted the different surgical techniques, mechanisms of actions and expected outcomes, and surgical and medical risks associated with each procedure. During this seminar, there was a long question and answer session where each questions was answered until there were no additional questions. Today, the patient had all of her questions answered and the decision was made today that the patient's preoperative evaluation is acceptable for them  to proceed with bariatric surgery  choosing the sleeve gastrectomy as her surgical option. The patient understands the plan of action    Her post-op meds have been sent to her pharmacy     Secondary Diagnoses:     DVT / Pulmonary Embolus Risk - The patient is at a higher risk for post operative DVT / pulmonary embolus secondary to their morbid obese status, relative sedentary lifestyle, and impending general anesthetic. We will plan to use anticoagulation therapy pre and post operative as well as  pneumatic compression devices and encourage ambulation once on the hospital nursing floor. The need for possible at home anticoagulation therapy has also been discussed and any decision on this matter will be made during post operative evaluations. The patient understands that their efforts at ambulation are of vital importance to reduce the risk of this complication thus placing significant burden on them as to the prevention of such issues. Signs and symptoms of DVT / PE have been discussed with the patient and they have been instructed to call the office if any these occur in the \"at home\" post op phase.     GERD -The patient understands that weight loss surgery is not a guaranteed cure for reflux disease but does understand the benefits that weight loss can have on reflux disease.  They also understand that at the time of surgery the gastroesophageal junction will be evaluated for the presence of a diaphragmatic hernia.  Hernias will be corrected always with the gastric band and sleeve gastrectomy procedures, but only on a case by case basis with the gastric bypass if it prevents our ability to perform the operation at hand, or if I feel that they would benefit long term with correction of this issue.  The patient also understands that neither weight loss surgery nor repair of a diaphragmatic hernia repair guarantees the complete cessation of the disease. They also understand there is a possibility of recurrence with a simple crural repair as is performed with these procedures. They understand they may have to continue their medications in the postoperative period. They have a good understanding that the gastric bypass procedure is better suited to total resolution of this issue and that neither the Lap Band nor sleeve gastrectomy is considered a curative procedure as it pertains to this diagnosis.     Hypertension - The patient has a clear understanding of how weight loss improves hypertension as a whole, but also they understand that there is a significant genetic component to this disease process. We will monitor the patients blood pressure while in the hospital and the plan would be to continue those medications postoperatively.  If a diuretic is being used we will stop them on discharge to prevent dehydration particularly with the sleeve gastrectomy and the gastric bypass procedures.  They will be instructed to monitor their blood pressure postoperatively while at home and notify their primary care physician in the event of any significantly high or uncharacteristic readings.     Polycystic Ovarian Syndrome -The patient does understand the association between obesity and the development of PCOS.  They understand that weight loss can often improve symptoms and in many cases cure the disease.  If the disease is associated with infertility this too is often corrected with adequate weight loss.  The patient understands that any change to the pharmaceutical treatment of her PCOS will be managed by the primary care physician or OB/GYN.       Signed By: Burak Urbano MD     May 16, 2022

## 2022-05-16 NOTE — PROGRESS NOTES
CLINICAL NUTRITION PRE-OPERATIVE EDUCATION    Patient's Name: Edelmira Hopkins   Age: 25 y.o. YOB: 2000   Sex: female    Education & Materials Provided: Post-op Binder to include:   Liquid Diet Shopping List    Supplemental Resource Guide: MVI, B12, Calcium Citrate, Vitamin D, Vitamin B50, and iron recommendations   Protein Supplement Information    Fluid Requirements/ No Straws   No Caffeine or Carbonation    No alcohol               No Snacks or No Concentrated Sweets      Exercising    Key Diet Principles             Addressed Current Habits/Changes to Make    Patient was instructed on clear liquid diet to follow for one (1) day prior to surgery.  Patient has been educated on the liquid diet to begin day 2 post-op and continue for 2 weeks post surgery.  Patient understands the transition of the diet and need to remain on full liquid diet until advanced by provider and dietitian. Summary:  Patient has completed the required amount of visits with the Registered Dietitian. During these nutrition visits, we focused on dietary changes, behavior changes, and the importance of establishing an exercise routine. The diet protocol that patient was prescribed emphasized on low carbohydrates (less than 50 grams per day prior to surgery and 60-80 grams of protein per day). At today's session, patient was educated on the post-op diet protocol. Patient understands the importance of keeping total fat and sugar less than 3 grams per serving. Patient is aware of the transition of the diet stages and is aware that they will be on clear liquids for 1 day pre-op, followed by modified full liquid diet for 2 weeks post-op. Patient understands the body needs ~ 60-70 grams of protein per day. During the liquid phase, patient will need a minimum of  60 grams of protein from shakes.   Once eating soft protein, patient will need 40-60 g protein from protein supplement shakes per day to meet goal of  g protein total intake/day. Patient is aware of the importance of the vitamins and protein drinks. We spent a lot of time talking about the vitamins. Patient understands the importance of being compliant with the diet protocol and the complications and risks that can occur if they are non-compliant with the nutritional protocol and non-compliant with the vitamins. Patient has also been educated on the pre-op liquid diet for 1 day. Patient understands that failure to comply in pre-op liquid diet could result in surgery being canceled. Patient's 2 week post-op nutrition virtual appointment has been scheduled. At this 2 week visit, RD will assess how patient is tolerating liquids and recommend to provider advancement of pts diet to soft pureed diet, if tolerating liquid protein diet without difficulty. Patient will also have a virtual appointment with RD again at 1 month post-op to assess tolerance of soft puree diet and advance to soft protein with soft vegetables, if soft pureed diet is tolerated. RD will assess patient's compliance with current protocol, including diet, vitamins, protein shakes, and exercise. Post-op diet guidelines will be reinforced. RD is available for questions and to meet with patient outside of the 2 week and 1 month post-op visit. RD contact information in pre-op binder was reviewed in class.     Vincent Mariscal MS, RD/LD  5/16/2022

## 2022-05-16 NOTE — H&P (VIEW-ONLY)
Sleeve Gastrectomy - History and Physical    Subjective: The patient is a 25 y.o. obese female with a Body mass index is 41.94 kg/m². .   she presents now to review their work up to date to see if they are a candidate for surgery and whether or not to proceed with the previously requested procedure. Bariatric comorbidities continue to include:   Patient Active Problem List   Diagnosis Code    Morbid obesity ([facility] [address] E66.01    Morbid obesity with body mass index (BMI) of 40.0 to 49.9 ([facility]) E66.01    Functional dyspepsia K30    Sleep disorder breathing G47.30    Hypertension I10    PCOS (polycystic ovarian syndrome) E28.2       They have been generally well prior to this visit and have had no recent significant illnesses. The patient has had no gastrointestinal issues that would preclude them from proceeding with the surgery they have chosen. Mayi Meade has recently tried a preoperative weight loss program  in addition to seeing a bariatric nutritionist preoperatively. We have discussed on at least one other occasion about the various types of surgical weight loss procedures and they have considered these options after our initial consultation. We have once again discussed these procedures in detail and they have now decided on a surgical procedure. They present today to discuss this and confirm that their evaluation pre operatively is acceptable to continue with surgery. The patient desires laparoscopic sleeve gastrectomy for surgical weight loss. The patients goal weight is 172 lb. (this represents a BMI of 28)    These goals are consistent with expected outcomes of their desired operation. her Medical goals are resolution of these health issues. Since the patient's original consult 4 months ago they have been seen by their GYN for a new diagnosis of PCOS (no medication as of today's visit).       They have been on no steroids in any form.     She has gained 4 lbs since the consult in late Jan      UGI was normal     Patient Active Problem List    Diagnosis Date Noted    PCOS (polycystic ovarian syndrome)     Morbid obesity ([facility] [address]     Morbid obesity with body mass index (BMI) of 40.0 to 49.9 ([facility])     Functional dyspepsia     Sleep disorder breathing     Hypertension      Past Surgical History:   Procedure Laterality Date    HX WISDOM TEETH EXTRACTION        Social History     Tobacco Use    Smoking status: Never Smoker    Smokeless tobacco: Never Used   Substance Use Topics    Alcohol use: Yes     Comment: once a month      Family History   Problem Relation Age of Onset    Hypertension Mother     Hypertension Father     Diabetes Father       Current Outpatient Medications   Medication Sig Dispense Refill    oxyCODONE-acetaminophen (PERCOCET) 5-325 mg per tablet Take 1 Tablet by mouth every four (4) hours as needed for Pain for up to 5 days. Max Daily Amount: 6 Tablets. 30 Tablet 0    enoxaparin (LOVENOX) 40 mg/0.4 mL 0.4 mL by SubCUTAneous route every twelve (12) hours every twelve (12) hours for 10 days. Indications: deep vein thrombosis prevention (Patient not taking: Reported on 5/16/2022) 20 Each 0    ondansetron (ZOFRAN ODT) 8 mg disintegrating tablet Take 1 Tablet by mouth every eight (8) hours as needed for Nausea or Vomiting.  (Patient not taking: Reported on 5/16/2022) 30 Tablet 0     Allergies   Allergen Reactions    Pollen Extracts Other (comments)        Review of Systems:     General - No history or complaints of unexpected fever, chills, or weight loss  Head/Neck - No history or complaints of headache, diplopia, dysphagia, hearing loss  Cardiac - No history or complaints of chest pain, palpitations, murmur, or shortness of breath  Pulmonary - No history or complaints of shortness of breath, productive cough, hemoptysis  Gastrointestinal - No history or complaints of reflux,  abdominal pain, obstipation/constipation, blood per rectum  Genitourinary - No history or complaints of hematuria/dysuria, stress urinary incontinence symptoms, or renal lithiasis  Musculoskeletal - No history or complaints of joint pain or muscular weakness  Hematologic - No history or complaints of bleeding disorders, blood transfusions, sickle cell anemia  Neurologic - No history or complaints of  migraine headaches, seizure activity, syncopal episodes, TIA or stroke  Integumentary - No history or complaints of rashes, abnormal nevi, skin cancer  Gynecological - unremarkable     Objective:     Visit Vitals  BP (!) 152/85   Pulse 99   Temp 97.3 °F (36.3 °C)   Resp 16   Ht 5' 7\" (1.702 m)   Wt 121.5 kg (267 lb 12.8 oz)   SpO2 99%   BMI 41.94 kg/m²     Physical Examination: General appearance - alert, well appearing, and in no distress  Mental status - alert, oriented to person, place, and time  Eyes - pupils equal and reactive, extraocular eye movements intact  Nose - normal and patent, no erythema, discharge or polyps  Mouth - mucous membranes moist, pharynx normal without lesions  Neck - supple, no significant adenopathy  Lymphatics - no palpable lymphadenopathy, no hepatosplenomegaly  Chest - clear to auscultation, no wheezes, rales or rhonchi, symmetric air entry  Heart - normal rate, regular rhythm, normal S1, S2, no murmurs, rubs, clicks or gallops  Abdomen - soft, nontender, nondistended, no masses or organomegaly  Back exam - full range of motion, no tenderness, palpable spasm or pain on motion  Neurological - alert, oriented, normal speech, no focal findings or movement disorder noted  Musculoskeletal - no joint tenderness, deformity or swelling  Extremities - peripheral pulses normal, no pedal edema, no clubbing or cyanosis  Skin - normal coloration and turgor, no rashes, no suspicious skin lesions noted    Labs / Preoperative Evaluation:        Recent Results (from the past 1008 hour(s))   EKG, 12 LEAD, INITIAL    Collection Time: 05/16/22  2:13 PM   Result Value Ref Range    Ventricular Rate 84 BPM    Atrial Rate 84 BPM    P-R Interval 180 ms    QRS Duration 98 ms    Q-T Interval 366 ms    QTC Calculation (Bezet) 432 ms    Calculated P Axis 57 degrees    Calculated R Axis 43 degrees    Calculated T Axis 5 degrees    Diagnosis       Normal sinus rhythm  Nonspecific T wave abnormality  Abnormal ECG  No previous ECGs available     [facility]. Collection Time: 05/16/22  2:18 PM   Result Value Ref Range    [facility] Specimens collected/sent to [facility]         Assessment:     Morbid obesity with comorbidity    Plan:     laparoscopic sleeve gastrectomy    This is a 25 y.o. female with a BMI of Body mass index is 41.94 kg/m². and the weight-related co-morbidties as noted above. John Vieira meets the NIH criteria for bariatric surgery based upon the BMI of Body mass index is 41.94 kg/m². and multiple weight-related co-morbidties. John Vieira has elected laparoscopic sleeve gastrectomy as her intervention of choice for treatment of morbid obestiy through surgical means secondary to its safety profile, rapid return to work  and decreases in operative risks over gastric bypass. In the office today, following Fiona's history and physical examination, a 40 minute discussion regarding the anatomic alterations for the laparoscopic sleeve gastrectomy was undertaken. The dietary expectations and the patient  dependent factors for success were thoroughly discussed, to include the need for interval follow-up and long-term dietary changes associated with success. The possible complications of the sleeve gastrectomy  were also discussed, to include;death, DVT/PE, staple line leak, bleeding, stricture formation, infection, nutritional deficiencies and sleeve dilation. Specific weight related outcomes for success were also discussed with an emphasis on careful and close follow-up with the first year and eating behavior modification as the baseline and cyclical hunger return.   The patient expressed an understanding of the above factors, and her questions were answered in their entirety. In addition, the patient attended a 1.5 hour power point seminar regarding obesity, surgical weight loss including, adjustable gastric band, gastric bypass, and sleeve gastrectomy. This discussion contrasted the different surgical techniques, mechanisms of actions and expected outcomes, and surgical and medical risks associated with each procedure. During this seminar, there was a long question and answer session where each questions was answered until there were no additional questions. Today, the patient had all of her questions answered and the decision was made today that the patient's preoperative evaluation is acceptable for them  to proceed with bariatric surgery  choosing the sleeve gastrectomy as her surgical option. The patient understands the plan of action    Her post-op meds have been sent to her pharmacy     Secondary Diagnoses:     DVT / Pulmonary Embolus Risk - The patient is at a higher risk for post operative DVT / pulmonary embolus secondary to their morbid obese status, relative sedentary lifestyle, and impending general anesthetic. We will plan to use anticoagulation therapy pre and post operative as well as  pneumatic compression devices and encourage ambulation once on the hospital nursing floor. The need for possible at home anticoagulation therapy has also been discussed and any decision on this matter will be made during post operative evaluations. The patient understands that their efforts at ambulation are of vital importance to reduce the risk of this complication thus placing significant burden on them as to the prevention of such issues. Signs and symptoms of DVT / PE have been discussed with the patient and they have been instructed to call the office if any these occur in the \"at home\" post op phase.     GERD -The patient understands that weight loss surgery is not a guaranteed cure for reflux disease but does understand the benefits that weight loss can have on reflux disease.  They also understand that at the time of surgery the gastroesophageal junction will be evaluated for the presence of a diaphragmatic hernia.  Hernias will be corrected always with the gastric band and sleeve gastrectomy procedures, but only on a case by case basis with the gastric bypass if it prevents our ability to perform the operation at hand, or if I feel that they would benefit long term with correction of this issue.  The patient also understands that neither weight loss surgery nor repair of a diaphragmatic hernia repair guarantees the complete cessation of the disease. They also understand there is a possibility of recurrence with a simple crural repair as is performed with these procedures. They understand they may have to continue their medications in the postoperative period. They have a good understanding that the gastric bypass procedure is better suited to total resolution of this issue and that neither the Lap Band nor sleeve gastrectomy is considered a curative procedure as it pertains to this diagnosis.     Hypertension - The patient has a clear understanding of how weight loss improves hypertension as a whole, but also they understand that there is a significant genetic component to this disease process. We will monitor the patients blood pressure while in the hospital and the plan would be to continue those medications postoperatively.  If a diuretic is being used we will stop them on discharge to prevent dehydration particularly with the sleeve gastrectomy and the gastric bypass procedures.  They will be instructed to monitor their blood pressure postoperatively while at home and notify their primary care physician in the event of any significantly high or uncharacteristic readings.     Polycystic Ovarian Syndrome -The patient does understand the association between obesity and the development of PCOS.  They understand that weight loss can often improve symptoms and in many cases cure the disease.  If the disease is associated with infertility this too is often corrected with adequate weight loss.  The patient understands that any change to the pharmaceutical treatment of her PCOS will be managed by the primary care physician or OB/GYN.       Signed By: Rudi Corona MD     May 16, 2022

## 2022-05-17 LAB
A-G RATIO,AGRAT: 1.8 RATIO (ref 1.1–2.6)
ABSOLUTE LYMPHOCYTE COUNT, 10803: 2.1 K/UL (ref 1–4.8)
ALBUMIN SERPL-MCNC: 4.4 G/DL (ref 3.5–5)
ALP SERPL-CCNC: 55 U/L (ref 25–115)
ALT SERPL-CCNC: 15 U/L (ref 5–40)
ANION GAP SERPL CALC-SCNC: 9 MMOL/L (ref 3–15)
AST SERPL W P-5'-P-CCNC: 12 U/L (ref 10–37)
BASOPHILS # BLD: 0.1 K/UL (ref 0–0.2)
BASOPHILS NFR BLD: 1 % (ref 0–2)
BILIRUB SERPL-MCNC: 0.2 MG/DL (ref 0.2–1.2)
BUN SERPL-MCNC: 11 MG/DL (ref 6–22)
CALCIUM SERPL-MCNC: 9.4 MG/DL (ref 8.4–10.5)
CHLORIDE SERPL-SCNC: 107 MMOL/L (ref 98–110)
CO2 SERPL-SCNC: 25 MMOL/L (ref 20–32)
CREAT SERPL-MCNC: 0.8 MG/DL (ref 0.5–1.2)
EOSINOPHIL # BLD: 0.1 K/UL (ref 0–0.5)
EOSINOPHIL NFR BLD: 1 % (ref 0–6)
ERYTHROCYTE [DISTWIDTH] IN BLOOD BY AUTOMATED COUNT: 13.6 % (ref 10–15.5)
GFRAA, 66117: >60
GFRNA, 66118: >60
GLOBULIN,GLOB: 2.4 G/DL (ref 2–4)
GLUCOSE SERPL-MCNC: 96 MG/DL (ref 70–99)
GRANULOCYTES,GRANS: 57 % (ref 40–75)
HCT VFR BLD AUTO: 38.4 % (ref 35.1–46.5)
HGB BLD-MCNC: 12 G/DL (ref 11.7–15.5)
LYMPHOCYTES, LYMLT: 35 % (ref 20–45)
MCH RBC QN AUTO: 27 PG (ref 26–34)
MCHC RBC AUTO-ENTMCNC: 31 G/DL (ref 31–36)
MCV RBC AUTO: 88 FL (ref 80–99)
MONOCYTES # BLD: 0.4 K/UL (ref 0.1–1)
MONOCYTES NFR BLD: 6 % (ref 3–12)
NEUTROPHILS # BLD AUTO: 3.4 K/UL (ref 1.8–7.7)
PLATELET # BLD AUTO: 277 K/UL (ref 140–440)
PMV BLD AUTO: 11.2 FL (ref 9–13)
POTASSIUM SERPL-SCNC: 4.2 MMOL/L (ref 3.5–5.5)
PREGNANCY-SERUM, 6158: NEGATIVE
PROT SERPL-MCNC: 6.8 G/DL (ref 6.4–8.3)
RBC # BLD AUTO: 4.38 M/UL (ref 3.8–5.2)
SODIUM SERPL-SCNC: 141 MMOL/L (ref 133–145)
WBC # BLD AUTO: 6 K/UL (ref 4–11)

## 2022-06-01 ENCOUNTER — HOSPITAL ENCOUNTER (OUTPATIENT)
Dept: PREADMISSION TESTING | Age: 22
Discharge: HOME | End: 2022-06-01

## 2022-06-01 VITALS — BODY MASS INDEX: 41.91 KG/M2 | HEIGHT: 67 IN | WEIGHT: 267 LBS

## 2022-06-01 RX ORDER — NYSTATIN 100000 [USP'U]/ML
500000 SUSPENSION ORAL
Status: CANCELLED | OUTPATIENT
Start: 2022-06-09 | End: 2022-06-10

## 2022-06-01 RX ORDER — FAMOTIDINE 20 MG/50ML
20 INJECTION, SOLUTION INTRAVENOUS ONCE
Status: CANCELLED | OUTPATIENT
Start: 2022-06-09 | End: 2022-06-09

## 2022-06-01 RX ORDER — ENOXAPARIN SODIUM 100 MG/ML
40 INJECTION SUBCUTANEOUS ONCE
Status: CANCELLED | OUTPATIENT
Start: 2022-06-09 | End: 2022-06-09

## 2022-06-01 RX ORDER — ACETAMINOPHEN 500 MG
1000 TABLET ORAL ONCE
Status: CANCELLED | OUTPATIENT
Start: 2022-06-09 | End: 2022-06-09

## 2022-06-01 RX ORDER — SODIUM CHLORIDE, SODIUM LACTATE, POTASSIUM CHLORIDE, CALCIUM CHLORIDE 600; 310; 30; 20 MG/100ML; MG/100ML; MG/100ML; MG/100ML
125 INJECTION, SOLUTION INTRAVENOUS CONTINUOUS
Status: CANCELLED | OUTPATIENT
Start: 2022-06-09

## 2022-06-01 NOTE — PERIOP NOTES
Patient advised to wear mask when entering any of the buildings. They will no longer need to be tested or quarantine prior to procedure. Patient does not meet criteria for special pop. No hx of sleep apnea or previous screening. Denies hx of MH. PCP is aware of surgery. CHG skin care kit given and process reviewed. Not participating in any research study or clinical trials. Patient instructed when coming in for surgery, do not use any lotions, creams or deodorant. Also to remove all jewelry, hairpins, make-up and nail polish. Instructed to wear something loose fitting and comfortable, easy to take off, easy to put on. Does not have a DNR or medical directive.

## 2022-06-08 ENCOUNTER — ANESTHESIA EVENT (OUTPATIENT)
Dept: SURGERY | Age: 22
DRG: 403 | End: 2022-06-08
Payer: MEDICAID

## 2022-06-09 ENCOUNTER — ANESTHESIA (OUTPATIENT)
Dept: SURGERY | Age: 22
DRG: 403 | End: 2022-06-09
Payer: MEDICAID

## 2022-06-09 ENCOUNTER — APPOINTMENT (OUTPATIENT)
Dept: SURGERY | Age: 22
End: 2022-06-09

## 2022-06-09 ENCOUNTER — HOSPITAL ENCOUNTER (INPATIENT)
Age: 22
LOS: 1 days | Discharge: HOME | DRG: 403 | End: 2022-06-10
Attending: SPECIALIST | Admitting: SPECIALIST
Payer: MEDICAID

## 2022-06-09 DIAGNOSIS — K76.0 NAFLD (NONALCOHOLIC FATTY LIVER DISEASE): ICD-10-CM

## 2022-06-09 DIAGNOSIS — E66.01 MORBID OBESITY WITH BODY MASS INDEX (BMI) OF 40.0 TO 49.9 (HCC): ICD-10-CM

## 2022-06-09 DIAGNOSIS — E66.01 MORBID OBESITY WITH BMI OF 40.0-44.9, ADULT (HCC): ICD-10-CM

## 2022-06-09 DIAGNOSIS — K31.89 GASTRIC WALL THICKENING: ICD-10-CM

## 2022-06-09 DIAGNOSIS — K30 FUNCTIONAL DYSPEPSIA: ICD-10-CM

## 2022-06-09 LAB
ABO + RH BLD: NORMAL
BLOOD GROUP ANTIBODIES SERPL: NORMAL
HCG UR QL: NEGATIVE
SPECIMEN EXP DATE BLD: NORMAL

## 2022-06-09 PROCEDURE — 77030027138 HC INCENT SPIROMETER -A: Performed by: SPECIALIST

## 2022-06-09 PROCEDURE — 36415 COLL VENOUS BLD VENIPUNCTURE: CPT

## 2022-06-09 PROCEDURE — 77030033271 HC TRCR ENDOSC EPATH2 J&J -B: Performed by: SPECIALIST

## 2022-06-09 PROCEDURE — 77030008602 HC TRCR ENDOSC EPATH J&J -B: Performed by: SPECIALIST

## 2022-06-09 PROCEDURE — 74011000250 HC RX REV CODE- 250: Performed by: ANESTHESIOLOGY

## 2022-06-09 PROCEDURE — 77030002912 HC SUT ETHBND J&J -A: Performed by: SPECIALIST

## 2022-06-09 PROCEDURE — 77030020782 HC GWN BAIR PAWS FLX 3M -B: Performed by: SPECIALIST

## 2022-06-09 PROCEDURE — 88313 SPECIAL STAINS GROUP 2: CPT

## 2022-06-09 PROCEDURE — 77030041460 HC APL VISTASEAL J&J -B: Performed by: SPECIALIST

## 2022-06-09 PROCEDURE — 65270000029 HC RM PRIVATE

## 2022-06-09 PROCEDURE — 77030040361 HC SLV COMPR DVT MDII -B: Performed by: SPECIALIST

## 2022-06-09 PROCEDURE — 88305 TISSUE EXAM BY PATHOLOGIST: CPT

## 2022-06-09 PROCEDURE — 74011250636 HC RX REV CODE- 250/636: Performed by: ANESTHESIOLOGY

## 2022-06-09 PROCEDURE — 77030008683 HC TU ET CUF COVD -A: Performed by: ANESTHESIOLOGY

## 2022-06-09 PROCEDURE — 0FB24ZX EXCISION OF LEFT LOBE LIVER, PERCUTANEOUS ENDOSCOPIC APPROACH, DIAGNOSTIC: ICD-10-PCS | Performed by: SPECIALIST

## 2022-06-09 PROCEDURE — 77030008518 HC TBNG INSUF ENDO STRY -B: Performed by: SPECIALIST

## 2022-06-09 PROCEDURE — 77030014008 HC SPNG HEMSTAT J&J -C: Performed by: SPECIALIST

## 2022-06-09 PROCEDURE — 77030002933 HC SUT MCRYL J&J -A: Performed by: SPECIALIST

## 2022-06-09 PROCEDURE — 77030008574 HC TBNG SUC IRR STRY -B: Performed by: SPECIALIST

## 2022-06-09 PROCEDURE — 77030016151 HC PROTCTR LNS DFOG COVD -B: Performed by: SPECIALIST

## 2022-06-09 PROCEDURE — 74011250636 HC RX REV CODE- 250/636: Performed by: SPECIALIST

## 2022-06-09 PROCEDURE — 77030034029 HC SLV GASTRCTMY CAL SYS DISP BOEH -C: Performed by: SPECIALIST

## 2022-06-09 PROCEDURE — 77030003580 HC NDL INSUF VERES J&J -B: Performed by: SPECIALIST

## 2022-06-09 PROCEDURE — 76060000033 HC ANESTHESIA 1 TO 1.5 HR: Performed by: SPECIALIST

## 2022-06-09 PROCEDURE — 77030040506 HC DRN WND MDII -A: Performed by: SPECIALIST

## 2022-06-09 PROCEDURE — 77030002916 HC SUT ETHLN J&J -A: Performed by: SPECIALIST

## 2022-06-09 PROCEDURE — 43239 EGD BIOPSY SINGLE/MULTIPLE: CPT | Performed by: SPECIALIST

## 2022-06-09 PROCEDURE — 77030034154 HC SHR COAG HARM ACE J&J -F: Performed by: SPECIALIST

## 2022-06-09 PROCEDURE — 77030010515 HC APPL ENDOCLP LIG J&J -B: Performed by: SPECIALIST

## 2022-06-09 PROCEDURE — 43775 LAP SLEEVE GASTRECTOMY: CPT | Performed by: SPECIALIST

## 2022-06-09 PROCEDURE — 81025 URINE PREGNANCY TEST: CPT

## 2022-06-09 PROCEDURE — 77030011808 HC STPLR ENDOSCOPIC J&J -D: Performed by: SPECIALIST

## 2022-06-09 PROCEDURE — 77030008477 HC STYL SATN SLP COVD -A: Performed by: ANESTHESIOLOGY

## 2022-06-09 PROCEDURE — 77030009426 HC FCPS BIOP ENDOSC BSC -B: Performed by: SPECIALIST

## 2022-06-09 PROCEDURE — 77030041523 HC SEALNT FIBRN VITASEAL J&J -E: Performed by: SPECIALIST

## 2022-06-09 PROCEDURE — 77030002966 HC SUT PDS J&J -A: Performed by: SPECIALIST

## 2022-06-09 PROCEDURE — 77030039961 HC KT CUST COLON BSC -D: Performed by: SPECIALIST

## 2022-06-09 PROCEDURE — 74011000250 HC RX REV CODE- 250: Performed by: SPECIALIST

## 2022-06-09 PROCEDURE — 2709999900 HC NON-CHARGEABLE SUPPLY: Performed by: SPECIALIST

## 2022-06-09 PROCEDURE — 76010000149 HC OR TIME 1 TO 1.5 HR: Performed by: SPECIALIST

## 2022-06-09 PROCEDURE — 88307 TISSUE EXAM BY PATHOLOGIST: CPT

## 2022-06-09 PROCEDURE — 77030006643: Performed by: ANESTHESIOLOGY

## 2022-06-09 PROCEDURE — 47379 UNLISTED LAPS PX LIVER: CPT | Performed by: SPECIALIST

## 2022-06-09 PROCEDURE — 0DB64Z3 EXCISION OF STOMACH, PERCUTANEOUS ENDOSCOPIC APPROACH, VERTICAL: ICD-10-PCS | Performed by: SPECIALIST

## 2022-06-09 PROCEDURE — 77030012893: Performed by: SPECIALIST

## 2022-06-09 PROCEDURE — 0DB78ZX EXCISION OF STOMACH, PYLORUS, VIA NATURAL OR ARTIFICIAL OPENING ENDOSCOPIC, DIAGNOSTIC: ICD-10-PCS | Performed by: SPECIALIST

## 2022-06-09 PROCEDURE — 77030010030: Performed by: SPECIALIST

## 2022-06-09 PROCEDURE — 77030002901 HC SUT DEV MCLOSE MPSA - B: Performed by: SPECIALIST

## 2022-06-09 PROCEDURE — 76210000006 HC OR PH I REC 0.5 TO 1 HR: Performed by: SPECIALIST

## 2022-06-09 PROCEDURE — 77030009968 HC RELD STPLR ENDOSC J&J -D: Performed by: SPECIALIST

## 2022-06-09 PROCEDURE — 86900 BLOOD TYPING SEROLOGIC ABO: CPT

## 2022-06-09 PROCEDURE — 77030008603 HC TRCR ENDOSC EPATH J&J -C: Performed by: SPECIALIST

## 2022-06-09 PROCEDURE — 74011250637 HC RX REV CODE- 250/637: Performed by: SPECIALIST

## 2022-06-09 RX ORDER — PHENYLEPHRINE HCL IN 0.9% NACL 1 MG/10 ML
SYRINGE (ML) INTRAVENOUS AS NEEDED
Status: DISCONTINUED | OUTPATIENT
Start: 2022-06-09 | End: 2022-06-09 | Stop reason: HOSPADM

## 2022-06-09 RX ORDER — FENTANYL CITRATE 50 UG/ML
25 INJECTION, SOLUTION INTRAMUSCULAR; INTRAVENOUS AS NEEDED
Status: DISCONTINUED | OUTPATIENT
Start: 2022-06-09 | End: 2022-06-09 | Stop reason: HOSPADM

## 2022-06-09 RX ORDER — SODIUM CHLORIDE 0.9 % (FLUSH) 0.9 %
5-40 SYRINGE (ML) INJECTION AS NEEDED
Status: DISCONTINUED | OUTPATIENT
Start: 2022-06-09 | End: 2022-06-09 | Stop reason: HOSPADM

## 2022-06-09 RX ORDER — SODIUM CHLORIDE, SODIUM LACTATE, POTASSIUM CHLORIDE, CALCIUM CHLORIDE 600; 310; 30; 20 MG/100ML; MG/100ML; MG/100ML; MG/100ML
125 INJECTION, SOLUTION INTRAVENOUS CONTINUOUS
Status: DISCONTINUED | OUTPATIENT
Start: 2022-06-09 | End: 2022-06-09 | Stop reason: HOSPADM

## 2022-06-09 RX ORDER — METOCLOPRAMIDE HYDROCHLORIDE 5 MG/ML
INJECTION INTRAMUSCULAR; INTRAVENOUS AS NEEDED
Status: DISCONTINUED | OUTPATIENT
Start: 2022-06-09 | End: 2022-06-09 | Stop reason: HOSPADM

## 2022-06-09 RX ORDER — GLYCOPYRROLATE 0.2 MG/ML
INJECTION INTRAMUSCULAR; INTRAVENOUS AS NEEDED
Status: DISCONTINUED | OUTPATIENT
Start: 2022-06-09 | End: 2022-06-09 | Stop reason: HOSPADM

## 2022-06-09 RX ORDER — HYDROMORPHONE HYDROCHLORIDE 1 MG/ML
0.5 INJECTION, SOLUTION INTRAMUSCULAR; INTRAVENOUS; SUBCUTANEOUS
Status: DISCONTINUED | OUTPATIENT
Start: 2022-06-09 | End: 2022-06-09 | Stop reason: HOSPADM

## 2022-06-09 RX ORDER — SODIUM CHLORIDE 0.9 % (FLUSH) 0.9 %
5-40 SYRINGE (ML) INJECTION EVERY 8 HOURS
Status: DISCONTINUED | OUTPATIENT
Start: 2022-06-09 | End: 2022-06-09 | Stop reason: HOSPADM

## 2022-06-09 RX ORDER — ONDANSETRON 2 MG/ML
4 INJECTION INTRAMUSCULAR; INTRAVENOUS
Status: DISCONTINUED | OUTPATIENT
Start: 2022-06-09 | End: 2022-06-10 | Stop reason: HOSPADM

## 2022-06-09 RX ORDER — NYSTATIN 100000 [USP'U]/ML
500000 SUSPENSION ORAL
Status: COMPLETED | OUTPATIENT
Start: 2022-06-09 | End: 2022-06-09

## 2022-06-09 RX ORDER — SODIUM CHLORIDE, SODIUM LACTATE, POTASSIUM CHLORIDE, CALCIUM CHLORIDE 600; 310; 30; 20 MG/100ML; MG/100ML; MG/100ML; MG/100ML
125 INJECTION, SOLUTION INTRAVENOUS CONTINUOUS
Status: DISCONTINUED | OUTPATIENT
Start: 2022-06-09 | End: 2022-06-09

## 2022-06-09 RX ORDER — DIPHENHYDRAMINE HYDROCHLORIDE 50 MG/ML
25 INJECTION, SOLUTION INTRAMUSCULAR; INTRAVENOUS
Status: DISCONTINUED | OUTPATIENT
Start: 2022-06-09 | End: 2022-06-10 | Stop reason: HOSPADM

## 2022-06-09 RX ORDER — SODIUM CHLORIDE, SODIUM LACTATE, POTASSIUM CHLORIDE, AND CALCIUM CHLORIDE .6; .31; .03; .02 G/100ML; G/100ML; G/100ML; G/100ML
IRRIGANT IRRIGATION AS NEEDED
Status: DISCONTINUED | OUTPATIENT
Start: 2022-06-09 | End: 2022-06-09 | Stop reason: HOSPADM

## 2022-06-09 RX ORDER — MORPHINE SULFATE 10 MG/ML
6 INJECTION, SOLUTION INTRAMUSCULAR; INTRAVENOUS
Status: DISCONTINUED | OUTPATIENT
Start: 2022-06-09 | End: 2022-06-10

## 2022-06-09 RX ORDER — ACETAMINOPHEN 500 MG
1000 TABLET ORAL ONCE
Status: COMPLETED | OUTPATIENT
Start: 2022-06-09 | End: 2022-06-09

## 2022-06-09 RX ORDER — ROCURONIUM BROMIDE 10 MG/ML
INJECTION, SOLUTION INTRAVENOUS AS NEEDED
Status: DISCONTINUED | OUTPATIENT
Start: 2022-06-09 | End: 2022-06-09 | Stop reason: HOSPADM

## 2022-06-09 RX ORDER — KETAMINE HCL IN 0.9 % NACL 50 MG/5 ML
SYRINGE (ML) INTRAVENOUS AS NEEDED
Status: DISCONTINUED | OUTPATIENT
Start: 2022-06-09 | End: 2022-06-09 | Stop reason: HOSPADM

## 2022-06-09 RX ORDER — ENOXAPARIN SODIUM 100 MG/ML
40 INJECTION SUBCUTANEOUS ONCE
Status: COMPLETED | OUTPATIENT
Start: 2022-06-09 | End: 2022-06-09

## 2022-06-09 RX ORDER — KETOROLAC TROMETHAMINE 30 MG/ML
15 INJECTION, SOLUTION INTRAMUSCULAR; INTRAVENOUS EVERY 6 HOURS
Status: DISCONTINUED | OUTPATIENT
Start: 2022-06-09 | End: 2022-06-10 | Stop reason: HOSPADM

## 2022-06-09 RX ORDER — ONDANSETRON 2 MG/ML
INJECTION INTRAMUSCULAR; INTRAVENOUS AS NEEDED
Status: DISCONTINUED | OUTPATIENT
Start: 2022-06-09 | End: 2022-06-09 | Stop reason: HOSPADM

## 2022-06-09 RX ORDER — NALOXONE HYDROCHLORIDE 0.4 MG/ML
0.4 INJECTION, SOLUTION INTRAMUSCULAR; INTRAVENOUS; SUBCUTANEOUS AS NEEDED
Status: DISCONTINUED | OUTPATIENT
Start: 2022-06-09 | End: 2022-06-10 | Stop reason: HOSPADM

## 2022-06-09 RX ORDER — FAMOTIDINE 10 MG/ML
20 INJECTION INTRAVENOUS ONCE
Status: COMPLETED | OUTPATIENT
Start: 2022-06-09 | End: 2022-06-09

## 2022-06-09 RX ORDER — ENOXAPARIN SODIUM 100 MG/ML
40 INJECTION SUBCUTANEOUS EVERY 12 HOURS
Status: DISCONTINUED | OUTPATIENT
Start: 2022-06-09 | End: 2022-06-10 | Stop reason: HOSPADM

## 2022-06-09 RX ORDER — SODIUM CHLORIDE, SODIUM LACTATE, POTASSIUM CHLORIDE, CALCIUM CHLORIDE 600; 310; 30; 20 MG/100ML; MG/100ML; MG/100ML; MG/100ML
150 INJECTION, SOLUTION INTRAVENOUS CONTINUOUS
Status: DISCONTINUED | OUTPATIENT
Start: 2022-06-09 | End: 2022-06-10 | Stop reason: HOSPADM

## 2022-06-09 RX ORDER — PROPOFOL 10 MG/ML
INJECTION, EMULSION INTRAVENOUS AS NEEDED
Status: DISCONTINUED | OUTPATIENT
Start: 2022-06-09 | End: 2022-06-09 | Stop reason: HOSPADM

## 2022-06-09 RX ORDER — NEOSTIGMINE METHYLSULFATE 1 MG/ML
INJECTION, SOLUTION INTRAVENOUS AS NEEDED
Status: DISCONTINUED | OUTPATIENT
Start: 2022-06-09 | End: 2022-06-09 | Stop reason: HOSPADM

## 2022-06-09 RX ORDER — LIDOCAINE HYDROCHLORIDE 20 MG/ML
INJECTION, SOLUTION EPIDURAL; INFILTRATION; INTRACAUDAL; PERINEURAL AS NEEDED
Status: DISCONTINUED | OUTPATIENT
Start: 2022-06-09 | End: 2022-06-09 | Stop reason: HOSPADM

## 2022-06-09 RX ORDER — MIDAZOLAM HYDROCHLORIDE 1 MG/ML
INJECTION, SOLUTION INTRAMUSCULAR; INTRAVENOUS AS NEEDED
Status: DISCONTINUED | OUTPATIENT
Start: 2022-06-09 | End: 2022-06-09 | Stop reason: HOSPADM

## 2022-06-09 RX ADMIN — Medication 20 MG: at 13:37

## 2022-06-09 RX ADMIN — Medication 100 MCG: at 12:51

## 2022-06-09 RX ADMIN — Medication 30 MG: at 12:19

## 2022-06-09 RX ADMIN — MORPHINE SULFATE 6 MG: 10 INJECTION INTRAVENOUS at 17:03

## 2022-06-09 RX ADMIN — Medication 100 MCG: at 13:03

## 2022-06-09 RX ADMIN — MIDAZOLAM 2 MG: 1 INJECTION INTRAMUSCULAR; INTRAVENOUS at 12:11

## 2022-06-09 RX ADMIN — LIDOCAINE HYDROCHLORIDE 80 MG: 20 INJECTION, SOLUTION EPIDURAL; INFILTRATION; INTRACAUDAL; PERINEURAL at 12:19

## 2022-06-09 RX ADMIN — ENOXAPARIN SODIUM 40 MG: 100 INJECTION SUBCUTANEOUS at 20:21

## 2022-06-09 RX ADMIN — Medication 3 MG: at 13:27

## 2022-06-09 RX ADMIN — FAMOTIDINE 20 MG: 10 INJECTION, SOLUTION INTRAVENOUS at 09:23

## 2022-06-09 RX ADMIN — ROCURONIUM BROMIDE 50 MG: 10 INJECTION, SOLUTION INTRAVENOUS at 12:19

## 2022-06-09 RX ADMIN — NYSTATIN 500000 UNITS: 100000 SUSPENSION ORAL at 09:23

## 2022-06-09 RX ADMIN — FENTANYL CITRATE 25 MCG: 50 INJECTION, SOLUTION INTRAMUSCULAR; INTRAVENOUS at 14:11

## 2022-06-09 RX ADMIN — ONDANSETRON HYDROCHLORIDE 4 MG: 2 INJECTION INTRAMUSCULAR; INTRAVENOUS at 13:18

## 2022-06-09 RX ADMIN — Medication 100 MCG: at 13:13

## 2022-06-09 RX ADMIN — FENTANYL CITRATE 25 MCG: 50 INJECTION, SOLUTION INTRAMUSCULAR; INTRAVENOUS at 14:16

## 2022-06-09 RX ADMIN — Medication 100 MCG: at 12:54

## 2022-06-09 RX ADMIN — GLYCOPYRROLATE 0.2 MG: 0.2 INJECTION INTRAMUSCULAR; INTRAVENOUS at 12:11

## 2022-06-09 RX ADMIN — ENOXAPARIN SODIUM 40 MG: 100 INJECTION SUBCUTANEOUS at 09:23

## 2022-06-09 RX ADMIN — ONDANSETRON 4 MG: 2 INJECTION INTRAMUSCULAR; INTRAVENOUS at 17:03

## 2022-06-09 RX ADMIN — Medication 100 MCG: at 12:36

## 2022-06-09 RX ADMIN — GLYCOPYRROLATE 0.2 MG: 0.2 INJECTION INTRAMUSCULAR; INTRAVENOUS at 13:27

## 2022-06-09 RX ADMIN — SODIUM CHLORIDE, POTASSIUM CHLORIDE, SODIUM LACTATE AND CALCIUM CHLORIDE 150 ML/HR: 600; 310; 30; 20 INJECTION, SOLUTION INTRAVENOUS at 20:21

## 2022-06-09 RX ADMIN — SODIUM CHLORIDE, SODIUM LACTATE, POTASSIUM CHLORIDE, AND CALCIUM CHLORIDE: 600; 310; 30; 20 INJECTION, SOLUTION INTRAVENOUS at 13:31

## 2022-06-09 RX ADMIN — SODIUM CHLORIDE, SODIUM LACTATE, POTASSIUM CHLORIDE, AND CALCIUM CHLORIDE: 600; 310; 30; 20 INJECTION, SOLUTION INTRAVENOUS at 12:58

## 2022-06-09 RX ADMIN — GLYCOPYRROLATE 0.2 MG: 0.2 INJECTION INTRAMUSCULAR; INTRAVENOUS at 13:34

## 2022-06-09 RX ADMIN — ACETAMINOPHEN 1000 MG: 500 TABLET ORAL at 09:23

## 2022-06-09 RX ADMIN — Medication 3 G: at 12:28

## 2022-06-09 RX ADMIN — KETOROLAC TROMETHAMINE 15 MG: 30 INJECTION, SOLUTION INTRAMUSCULAR at 17:48

## 2022-06-09 RX ADMIN — METOCLOPRAMIDE 10 MG: 5 INJECTION, SOLUTION INTRAMUSCULAR; INTRAVENOUS at 12:30

## 2022-06-09 RX ADMIN — PROPOFOL 200 MG: 10 INJECTION, EMULSION INTRAVENOUS at 12:19

## 2022-06-09 RX ADMIN — KETOROLAC TROMETHAMINE 15 MG: 30 INJECTION, SOLUTION INTRAMUSCULAR at 23:32

## 2022-06-09 RX ADMIN — Medication 100 MCG: at 12:33

## 2022-06-09 RX ADMIN — MORPHINE SULFATE 6 MG: 10 INJECTION INTRAVENOUS at 23:32

## 2022-06-09 RX ADMIN — SODIUM CHLORIDE, SODIUM LACTATE, POTASSIUM CHLORIDE, AND CALCIUM CHLORIDE 125 ML/HR: 600; 310; 30; 20 INJECTION, SOLUTION INTRAVENOUS at 09:22

## 2022-06-09 NOTE — INTERVAL H&P NOTE
Update History & Physical    The Patient's History and Physical of May 16,   2022 was reviewed with the patient and I examined the patient. There was no change. The surgical site was confirmed by the patient and me. Plan:  The risk, benefits, expected outcome, and alternative to the recommended procedure have been discussed with the patient. Patient understands and wants to proceed with the procedure.     Electronically signed by Cheko Anaya MD on 6/9/2022 at 11:48 AM

## 2022-06-09 NOTE — PROGRESS NOTES
1455  Received client from PACU in satisfactory condition. Client is a pt of . Pt had a Lap Sleeve gastrectomy,Oversew Staple line,Wedge Liver Bx,IntraOp Endoscopywith Bx,   today. Client is sleepy but wakes up easily to verbal stimuli. O X 4. Client is calm and cooperative. Client denies numbness or tingling to any extremity. With + Radial,Posterior Tibial and Dorsalis Pedis pulses. Capillary Refill less than 3 seconds. Skin is warm , dry and skin color is appropriate to race. Client is negative for JVD. Bibasilar breath sounds clear. No use of accessory muscles. Bowel sounds *hypoactive to all quadrants. Abdomen is soft and non-tender. Client has not voided post-operatively. No bladder distention evident. No complaints of bladder discomfort. Client has  5 Lap sites to abdomen with gauze and medipore dressings. All Dressings are dry and intact. Jeffry hose intact to LE's. Sequential compression device applied. Client has RH 18 gauge PIV present and running LR at 50 ml/hr. Clients pain is 9/10 on 0-10 scale. Pt falling back to sleep when left alone. Client oriented to call bell use as well as bed use. Client oriented to phone and how to order meals. Call bell within reach. Bed in low position. Three side rails up. Armbands/ fall risk band intact. Dual skin check done with Gina Gilliland RN. No skin breakdown noted. 1536  Pt was incontinent of urine after dry heaving. Pt  out of bed to  BR to void. Pt also menstruating. Given peripad and panty. 1   Pt ambulated in hallway then back in bed. Family at bedside. 1703   Pt slept after ambulating. Pt is more awake and alert. Pain level 8/10. Medicated with Morphine 6 mg IV. Pt is dry heaving and spitting out old blood. Medicated with Morphine 6 mg and Zofran 4 mg IV.    1551   Sleeping on and off. Pain level 4/10.    1855   Pt ambulated in hallway then back in bed.

## 2022-06-09 NOTE — ANESTHESIA POSTPROCEDURE EVALUATION
Post-Anesthesia Evaluation and Assessment    Cardiovascular Function/Vital Signs  Visit Vitals  BP (!) 157/76   Pulse 72   Temp 36.8 °C (98.2 °F)   Resp 28   Ht 5' 7\" (1.702 m)   Wt 119.6 kg (263 lb 9.6 oz)   SpO2 100%   BMI 41.29 kg/m²       Patient is status post Procedure(s):  LAPAROSCOPIC SLEEVE GASTRECTOMY WITH OVERSEW STAPLE LINE, WEDGE LIVER  BIOPSY AND INTRAOPERATIVE ENDOSCOPY WITH BIOPSY. Nausea/Vomiting: Controlled. Postoperative hydration reviewed and adequate. Pain:  Pain Scale 1: Visual (06/09/22 1357)  Pain Intensity 1: 0 (06/09/22 1357)   Managed. Neurological Status:   Neuro (WDL): Within Defined Limits (06/09/22 1357)   At baseline. Mental Status and Level of Consciousness: Baseline and stable. Pulmonary Status:   O2 Device: Nasal cannula (06/09/22 1401)   Adequate oxygenation and airway patent. Complications related to anesthesia: None    Post-anesthesia assessment completed. No concerns. Patient has met all discharge requirements.     Signed By: Yuniel Rabago MD

## 2022-06-09 NOTE — ANESTHESIA PREPROCEDURE EVALUATION
Relevant Problems   CARDIOVASCULAR   (+) Hypertension      ENDOCRINE   (+) Morbid obesity (HCC)       Anesthetic History   No history of anesthetic complications            Review of Systems / Medical History  Patient summary reviewed, nursing notes reviewed and pertinent labs reviewed    Pulmonary  Within defined limits                 Neuro/Psych   Within defined limits           Cardiovascular    Hypertension: well controlled              Exercise tolerance: >4 METS  Comments: H/o HTN - off meds since jan 2022   GI/Hepatic/Renal  Within defined limits              Endo/Other        Morbid obesity  Pertinent negatives: No diabetes, hypothyroidism, hyperthyroidism and blood dyscrasia   Other Findings            Physical Exam    Airway  Mallampati: II  TM Distance: 4 - 6 cm  Neck ROM: normal range of motion   Mouth opening: Normal     Cardiovascular  Regular rate and rhythm,  S1 and S2 normal,  no murmur, click, rub, or gallop             Dental  No notable dental hx       Pulmonary  Breath sounds clear to auscultation               Abdominal  GI exam deferred       Other Findings            Anesthetic Plan    ASA: 3  Anesthesia type: general          Induction: Intravenous  Anesthetic plan and risks discussed with: Patient

## 2022-06-09 NOTE — PERIOP NOTES
TRANSFER - IN REPORT:    Verbal report received from ZACARIAS & Dr. Vikas hCeung on [facility]  being received from  OR (unit) for routine post - op      Report consisted of patients Situation, Background, Assessment and   Recommendations(SBAR). Information from the following report(s) SBAR was reviewed with the receiving nurse. Opportunity for questions and clarification was provided. Assessment completed upon patients arrival to unit and care assumed.

## 2022-06-09 NOTE — PERIOP NOTES
TRANSFER - OUT REPORT:    Verbal report given to Luis Miguel Lynch RN on Raffaele Farmer  being transferred to [address] [place] [address] (unit) for routine post - op       Report consisted of patients Situation, Background, Assessment and   Recommendations(SBAR). Information from the following report(s) SBAR was reviewed with the receiving nurse. Lines:   Peripheral IV 06/09/22 Posterior;Right Hand (Active)   Site Assessment Clean, dry, & intact 06/09/22 1348   Phlebitis Assessment 0 06/09/22 1348   Infiltration Assessment 0 06/09/22 1348   Dressing Status Clean, dry, & intact 06/09/22 1348   Dressing Type Tape;Transparent 06/09/22 1348   Hub Color/Line Status Infusing;Patent;Green 06/09/22 1348   Alcohol Cap Used No 06/09/22 7837        Opportunity for questions and clarification was provided.       Patient transported with:   Registered Nurse

## 2022-06-09 NOTE — PROGRESS NOTES
Transition of Care ([facility]) Plan:          Pt admitted for an elective Laparoscopic Sleeve Gastrectomy  surgical procedure. Pt is independent. Please encourage ambulation. No transition of care needs identified at this time. Anticipate pt will be medically stable for discharge within the next 24-48 hours with physician follow up. CM available to assist as needed. [facility] Transportation:   How is patient being transported at discharge? Family/Friend      When? Once cleared by physician     Is transport scheduled? N/A      Follow-up appointment and transportation:   PCP/Specialist?  See AVS for Appointment         Who is transporting to the follow-up appointment? Self/Family/Friend      Is transport for follow up appointment scheduled? N/A    Communication plan (with patient/family): Who is being called? Patient or Next of Kin? Responsible party? Patient      What number(s) is to be used? See Facesheet      What service provider is calling for [facility] services? When are they calling? Readmission Risk? (Green/Low; Yellow/Moderate; Red/High):  [facility] here to complete [address] including selection of the Healthcare Decision Maker Relationship (ie \"Primary\")  Care Management Interventions  PCP Verified by CM: Yes  Palliative Care Criteria Met (RRAT>21 & CHF Dx)?: No  Mode of Transport at Discharge:  Other (see comment) (family)  Physical Therapy Consult: No  Occupational Therapy Consult: No  Speech Therapy Consult: No  Support Systems: Parent(s),Other Family Member(s)  Confirm Follow Up Transport: Family  The Plan for Transition of Care is Related to the Following Treatment Goals : home with family assistance  Discharge Location  Patient Expects to be Discharged to[de-identified] Home with family assistance

## 2022-06-09 NOTE — PROGRESS NOTES
Problem: Falls - Risk of  Goal: *Absence of Falls  Description: Document Ehsan Selvin Fall Risk and appropriate interventions in the flowsheet.   Outcome: Progressing Towards Goal  Note: Fall Risk Interventions:  Mobility Interventions: Patient to call before getting OOB    Mentation Interventions: Door open when patient unattended    Medication Interventions: Patient to call before getting OOB

## 2022-06-09 NOTE — PERIOP NOTES
Date 06/08/22 0700 - 06/09/22 0659(Not Admitted) 06/09/22 0700 - 06/10/22 0659   Shift [id number] 1900-0659 24 Hour Total 8308-5874 5518-7214 24 Hour Total   INTAKE   P.O.    0  0     P. O.    0  0   I.V.    2300  2300     I.V.    300  300     Volume (lactated Ringers infusion)    2000 2000   Shift Total(mL/kg)    3712(85.0)  6458(54.3)   OUTPUT   Shift Total(mL/kg)         NET    2300  2300   Weight (kg)    119.6 119.6 119.6

## 2022-06-09 NOTE — ROUTINE PROCESS
1454  TRANSFER - IN REPORT:    Verbal report received from [facility] RN(name) on Wolfgang Wild  being received from [facility]) for routine post - op      Report consisted of patients Situation, Background, Assessment and   Recommendations(SBAR). Information from the following report(s) SBAR, Kardex and MAR was reviewed with the receiving nurse. Opportunity for questions and clarification was provided. Assessment completed upon patients arrival to unit and care assumed.

## 2022-06-09 NOTE — ROUTINE PROCESS
1910  Bedside and Verbal shift change report given to Maribel Gerardo RN (oncoming nurse) by Juliana Coppola RN (offgoing nurse). Report included the following information SBAR, Kardex and MAR.

## 2022-06-09 NOTE — PERIOP NOTES
Verbal hand off at the bedside with Jose Martin Cabelloavers provided opportunity for questions. Vital signs with in limits all belongings accounted for to include bag of clothes and cell phone.

## 2022-06-09 NOTE — PERIOP NOTES
Reviewed PTA medication list with patient/caregiver and patient/caregiver denies any additional medications. Patient admits to having a responsible adult care for them at home for at least 24 hours after surgery. Patient encouraged to use gown warming system and informed that using said warming gown to regulate body temperature prior to a procedure has been shown to help reduce the risks of blood clots and infection. Patient's pharmacy of choice verified and documented in PTA medication section. Dual skin assessment & fall risk band verification completed with SUZAN Lopez RN. Urine pregnancy results negative and verified with SUZAN Lopez RN.

## 2022-06-09 NOTE — OP NOTES
OPERATIVE REPORT         Patient:Fiona Interiano   : 2000  Medical Record Number:[id number]    Pre-operative Diagnosis:  HYPERTENSION,MORBID OBESITY,BMI 41, FATTY LIVER  Post-operative Diagnosis: HYPERTENSION,MORBID OBESITY,BMI 41, FATTY LIVER  Procedure: Procedure(s):  LAPAROSCOPIC SLEEVE GASTRECTOMY  OVERSEW STAPLE LINE  WEDGE LIVER  BIOPSY  INTRAOPERATIVE ENDOSCOPY WITH BIOPSY  Location: [facility]  Surgeon: Luis F Marquez MD  Assistant:  Uyen Sanchez [facility]  - (there are no qualified interns, residents, or any other qualified house   physicians available to assist with this surgery) performed retraction of various structures,  assisted in   creation of the gastric sleeve, fired stapling devices, obtained hemostasis along staple lines via hemoclips,       Anesthesia: General       Specimens: 1. Gastric Sleeve Resection                       2. Liver Wedge Biopsy                       3. Endoscopy biopsy    EBL: less than 10 cc  Additional Findings: None  Complications: None             STATEMENT OF MEDICAL NECESSITY: The patient is a 25y.o.-year-old female who has   had a history of obesity. she has failed conservative weight loss measures,   such began to consider weight loss surgical options. she chose the   sleeve gastrectomy as a means of surgical weight control. she has undergone   nutritional and psychological teaching at this time period and does wish to proceed   with sleeve gastrectomy. OPERATIVE PROCEDURE: The patient was brought to the operating room, placed   on the table in supine position at which time general  anesthesia was administered   without any difficulty. The abdomen was then prepped and draped in the   usual sterile fashion. Using a 15 blade, a 1 cm incision was made just to the   left of the umbilicus. The veress needle approach was used to gain access to   the peritoneal cavity which was then insufflated.  The Visi-Port was then placed   at that site,then 4 additional trocars were placed in the usual U-shaped   configuration with a subxiphoid incision being made to accommodate the   [facility] retractor. On entering the abdomen, the patient was noted to have a   moderate fatty liver with possible evidence of early steatohepatitis. I elevated the liver and noted the   patient had no diaphragmatic hernia present. I began the operation by choosing an area 2-3 cm   proximal to the pylorus and within the gastroepiploic vessels I began to divide off these vessels individually. I moved cephalad toward short gastric vessels, which were very difficult to take down due to the proximity   to the splenic hilum. I was able to do so, clearing the entire left crural area. I then placed a Visigi tube,   impacting at the distal antrum. I then began the resection with the powered [place]   stapler using the green loads with Endopath buttress strips for the first firing tangential along the   antral region. The second and subsequent firings were used with gold  reloads and the same buttress strips reaching just past the incisura region. The remainder of the 5 vertical   firings completed the resection at the left crural region. I then tested   the pouch via the Visigi tube using dilute methylene blue,it was noted to be completely   Watertight. At this juncture due to some proximal gastric wall thickening near the apex of the sleeve,   I chose to oversew the staple line. I obtained a 2-0 Ethibond suture and oversewed the staple line from the   apex to the mid body of the sleeve. This portion of the procedure took quite some time to perform as I began at   the apex of the staple line and oversewed it all the way down to the incisura region. The total time spent   performing this oversew was in excess of 25 minutes duration.   We did perform this due to the thickness   of the gastric wall and the realization that oversewing staple lines almost certainly reduces the leak rate in   an area of high risk region of the stomach. This went without event. I then left the operative field   and proceeded to the the head of the bed and performed an   intraoperative EGD. The scope was passed successfully into the gastric sleeve to the level of the pylorus. Biopsies were taken of this region and submitted to test both for H Pylori and for pathologic diagnosis. There was no bleeding noted and no leak appreciated with air insufflation. I then returned to the surgical field. I then obtained hemostasis along the staple line using   Hemoclips and sutures where needed. I then used 2 separate 2-0 Ethibond sutures to secure the lateral   aspect of the newly created sleeve stomach to the resected edge of the gastrocolic omentum in an effort   to maintain the continuity of the sleeve and prevent twisting. I then used Eviseal   along the entire staple line to obtain hemostasis and then place Surgicel Snow over the staple line also. With all this having been completed, I then removed the liver retractor. I performed a liver wedge biopsy of   the left lobe of the liver due to its abnormality and submitted to pathology for permanent section. I then placed   a SHEREE drain in the left upper quadrant region along the staple line. I removed the specimen from the operative   field via the LLQ incision. I closed the left lower quadrant trocar site using a transabdominal #0 PDS   suture along the fascia, and all skin incisions were then closed using 4-0 subcuticular Monocryl. Steri-Strips   and sterile dressings were applied. The patient tolerated the procedure well.        Tamie Ryan M.D.

## 2022-06-10 ENCOUNTER — APPOINTMENT (OUTPATIENT)
Dept: GENERAL RADIOLOGY | Age: 22
DRG: 403 | End: 2022-06-10
Attending: SPECIALIST
Payer: MEDICAID

## 2022-06-10 VITALS
SYSTOLIC BLOOD PRESSURE: 153 MMHG | HEART RATE: 82 BPM | BODY MASS INDEX: 41.37 KG/M2 | RESPIRATION RATE: 17 BRPM | WEIGHT: 263.6 LBS | OXYGEN SATURATION: 100 % | HEIGHT: 67 IN | TEMPERATURE: 98.9 F | DIASTOLIC BLOOD PRESSURE: 84 MMHG

## 2022-06-10 PROCEDURE — 74240 X-RAY XM UPR GI TRC 1CNTRST: CPT

## 2022-06-10 PROCEDURE — 74011000250 HC RX REV CODE- 250: Performed by: SPECIALIST

## 2022-06-10 PROCEDURE — 74011000636 HC RX REV CODE- 636: Performed by: SPECIALIST

## 2022-06-10 PROCEDURE — 74011250637 HC RX REV CODE- 250/637: Performed by: SPECIALIST

## 2022-06-10 PROCEDURE — 74011250636 HC RX REV CODE- 250/636: Performed by: SPECIALIST

## 2022-06-10 PROCEDURE — C9113 INJ PANTOPRAZOLE SODIUM, VIA: HCPCS | Performed by: SPECIALIST

## 2022-06-10 RX ORDER — OXYCODONE AND ACETAMINOPHEN 5; 325 MG/1; MG/1
1-2 TABLET ORAL
Status: DISCONTINUED | OUTPATIENT
Start: 2022-06-10 | End: 2022-06-10 | Stop reason: HOSPADM

## 2022-06-10 RX ADMIN — OXYCODONE HYDROCHLORIDE AND ACETAMINOPHEN 1 TABLET: 5; 325 TABLET ORAL at 09:52

## 2022-06-10 RX ADMIN — SODIUM CHLORIDE, PRESERVATIVE FREE 40 MG: 5 INJECTION INTRAVENOUS at 09:55

## 2022-06-10 RX ADMIN — SODIUM CHLORIDE, POTASSIUM CHLORIDE, SODIUM LACTATE AND CALCIUM CHLORIDE 150 ML/HR: 600; 310; 30; 20 INJECTION, SOLUTION INTRAVENOUS at 11:24

## 2022-06-10 RX ADMIN — ENOXAPARIN SODIUM 40 MG: 100 INJECTION SUBCUTANEOUS at 09:54

## 2022-06-10 RX ADMIN — KETOROLAC TROMETHAMINE 15 MG: 30 INJECTION, SOLUTION INTRAMUSCULAR at 06:19

## 2022-06-10 RX ADMIN — KETOROLAC TROMETHAMINE 15 MG: 30 INJECTION, SOLUTION INTRAMUSCULAR at 13:03

## 2022-06-10 RX ADMIN — DIATRIZOATE MEGLUMINE AND DIATRIZOATE SODIUM 40 ML: 660; 100 LIQUID ORAL; RECTAL at 08:11

## 2022-06-10 RX ADMIN — MORPHINE SULFATE 6 MG: 10 INJECTION INTRAVENOUS at 04:37

## 2022-06-10 NOTE — PROGRESS NOTES
0740  Bedside shift change report given to Thao ACEVES RN (oncoming nurse) by Monika Vincent RN (offgoing nurse). Report included the following information SBAR, Kardex, Intake/Output and MAR.     0830  Assumed care at this time. Patient alert and oriented x4. Denies SOB, chest pain. Shows no signs of distress. Patient lungs clear diminished bilaterally. Cap refill < 3 sec to all extremities. Patient has 18 G IV to R hand CDI. Stated pain 6/10. Dressing to abdomen is CDI. SHEREE CDI with serosanguineous output. SCDs and lauri stockings applied to BLE. Incentive spirometer at bedside. Patient reaches 1250 on IS. Bowel sounds present. Skin intact. Patient call light and possessions within reach. Bed locked and in lowest position. Nurse will continue to monitor. [address] removed. Gauze and tape placed. 1257  All medications reviewed individually with patient. Opportunities for questions and concerns provided. Patient to be discharged via (car, transport, ambulance). Patient's armband appropriately discarded.

## 2022-06-10 NOTE — PROGRESS NOTES
Bariatric Surgery                POD #1    Visit Vitals  BP (!) 152/86 (BP 1 Location: Left upper arm, BP Patient Position: Sitting)   Pulse 94   Temp 99.1 °F (37.3 °C)   Resp 18   Ht 5' 7\" (1.702 m)   Wt 119.6 kg (263 lb 9.6 oz)   LMP 06/03/2022   SpO2 99%   BMI 41.29 kg/m²     Patient has minimal complaints of pain, minimal nausea noted     Exam:  Appears well in no distress  Lungs- clear bilaterally  Abd - soft, incisions look good without erythema           SHEREE with minimal serosanguinous output  Extremities- no new edema or swelling    UGI - pending    Data Review:    Labs: Results:       Chemistry No results for input(s): GLU, NA, K, CL, CO2, BUN, CREA, CA, AGAP, BUCR, TBIL, AP, TP, ALB, GLOB, AGRAT in the last 72 hours. No lab exists for component: GPT   CBC w/Diff No results for input(s): WBC, RBC, HGB, HCT, PLT, GRANS, LYMPH, EOS, RETIC, HGBEXT, HCTEXT, PLTEXT in the last 72 hours. Coagulation No results for input(s): PTP, INR, APTT, INREXT in the last 72 hours. Liver Enzymes No results for input(s): TP, ALB, TBIL, AP in the last 72 hours. No lab exists for component: SGOT, GPT, DBIL       Assessment/Plan: S/P  laparoscopic sleeve gastrectomy - doing well without any issues    Following orders after UGI    1. Start bariatric diet and protein shakes  2. D/C IV pain meds and start PO pain meds  3. D/C SHEREE drain  4. Likley PM D/C if  Cont ok and tolerate PO

## 2022-06-10 NOTE — ROUTINE PROCESS
Bedside and Verbal shift change report given to [facility] [employer], RN by Leopoldo Galeas. Report included the following information SBAR, Kardex, OR Summary, Intake/Output and MAR.

## 2022-06-10 NOTE — PROGRESS NOTES
conducted an initial consultation and Spiritual Assessment for Meli Parkinson, who is a 22 y.o.,female. Patients Primary Language is: [place]. According to the patients EMR Voodoo Affiliation is: No preference. The reason the Patient came to the hospital is:   Patient Active Problem List    Diagnosis Date Noted    Morbid obesity with BMI of 40.0-44.9, adult ([facility] 06/09/2022    PCOS (polycystic ovarian syndrome)     Morbid obesity ([facility]     Morbid obesity with body mass index (BMI) of 40.0 to 49.9 (HCC)     Functional dyspepsia     Sleep disorder breathing     Hypertension         The  provided the following Interventions:  Initiated a relationship of care and support. Listened empathically. Provided information about Spiritual Care Services. Offered assurance of prayer on patient's behalf. Chart reviewed. The following outcomes where achieved:   confirmed Patient's Voodoo Affiliation. Patient processed feeling about current hospitalization. Patient expressed gratitude for 's visit. Assessment:  Patient does not have any Orthodoxy/cultural needs that will affect patients preferences in health care. Plan:  Chaplains will continue to follow and will provide pastoral care on an as needed/requested basis.  recommends bedside caregivers page  on duty if patient shows signs of acute spiritual or emotional distress. Rev.  Inez Granados, Certified Respecting [address] [place] [address] Consultant  [facility]  [phone]

## 2022-06-10 NOTE — DISCHARGE INSTRUCTIONS
[facility]  David Gray. Antony Landrum M.D., F.A.C.S.  [address]. [address] [place] [address] Rd, [address] [facility]  Office: [phone]    Fax:  [phone]    Discharge Instruction for Gastric Bypass / Sleeve Gastrectomy Patients    Diet:   Continue with the liquid diet until you are seen in the office. Make sure you sip fluids all day. Aim for  Gms of protein every day. Activity:   Walking is encouraged. Rest when you are tired.  You may shower.  You may climb stairs. Take your time.  No lifting more than 10-15 lbs.  If you feel discomfort during an activity, rest.   Do not drive for 1 week. Wound Care:   Clean incisions with soap and water when in the shower. Pat dry.  Leave steri-strips on until they fall off.  A small amount of drainage may be present from the incisions. Contact the office if you notice an increase in drainage, an odor, increased redness, or fever > 100.5. Medications:   You will receive a prescription for pain medication at the time of discharge.  For upset stomach you may take over the counter medications such as Maalox, Mylanta, Pepcid, Zantac, or Tagamet.  You may take Tylenol   Non-aspirin based arthritis medications may be resumed after the first month.  Take a childrens or adult chewable multivitamin.  Milk of Magnesia will help with constipation.  It is fine to take your usual home medications. Blood pressure medications should be continued after surgery. Diabetic medications can frequently be reduced very quickly after surgery. Diabetics should continue to monitor blood sugars frequently after surgery and contact the prescribing physician for any questions. Follow-Up Appointment:   If you do not already have a follow-up appointment scheduled, contact the office in the next few days to obtain one. It is usually scheduled for 10-14 days after you surgery date. Office phone number:  [phone].         REV  09/2010

## 2022-06-10 NOTE — PROGRESS NOTES
56 - Bedside report received from Shante, [facility]. Patient in bed. Pain 3/10.     2020 - Patient in bed at this time. IV to RH  intact and patent. Sequential compression device bilaterally. TEDs to BLE. Dressings to abd lap sites CDI. SHEREE with zero output, team aware per report.+ CMS. Pt A & O x 4. LS clear, on RA. Abdomen soft, tender and ND. + BS to all 4 quadrants. Denies nausea. Pain 3/10. Call light within reach. 0203-Pt ambulated the hallways, joselyn. Well. Pt had an uneventful shift. Uses IS every hour while awake. Pt ambulated without assistance. Pain remained well-controlled with the ordered medication. No issues/concerns at this time.  Call bell within reach

## 2022-06-10 NOTE — NURSE NAVIGATOR
Patient sitting on side of bed sipping protein drink and tolerating well. Vitals:   Blood pressure (!) 152/86, pulse 94, temperature 99.1 °F (37.3 °C), resp. rate 18, height 5' 7\" (1.702 m), weight 119.6 kg (263 lb 9.6 oz), last menstrual period 06/03/2022, SpO2 99 %. Output: reviewed, and patient verified urinating clear, yellow urine. Pulmonary: Clear in all lobes  Cardiac: Regular rate and rhythm  Abdomen: Bowel sounds hypo-active x4. Lap sites without erythema, swelling,  and/or drainage. SHEREE drain with a small amount of serosanguinous drainage. SCD's: Positioned and operating WNL    Patient with expected pain, but is being managed and is currently tolerable. No nausea and/or vomiting. Patient has been ambulating the halls. Patient has not had the opportunity to swallow pills. Post-op diet progression discussed with patient. Patient to be discharged on a bariatric full liquid diet. Patient verbalized understanding of liquid diet for next two weeks until first post-op visit. Goal of four ounces per hour with one ounce being protein was clearly understood. Medications were discussed (i.e., pain- Percocet, Tylenol, not to use aspirin or ibuprofen based products, as well as steroids). Constipation was discussed and ways to alleviate it were discussed. Education completed on IS use and to ambulate every hour when at home. Patient completed a return demonstration on IS with no issues or concerns from this RN. Lovenox and Percocet filled and in the home. Lovenox and Percocet education provided, and patient verbalized understanding. Patient has chewable multi-vitamin, probiotic, and Prilosec in the home; they were reviewed. Ketosis was also reviewed. Patient given a report card to record intake and a handout to support bedside education. All questions were answered by this RN, and patient verbalized understanding to all education provided.   Goals for discharge were discussed, and patient verbalized understanding. Post-op follow-up appt. antonino landeros.

## 2022-06-10 NOTE — DISCHARGE SUMMARY
Discharge Summary    Patient: Anastacio Melton               Sex: female          DOA: 6/9/2022         YOB: 2000      Age:  25 y.o.        LOS:  LOS: 1 day                Admit Date: 6/9/2022    Discharge Date: 6/10/2022    Admission Diagnoses: Morbid obesity with BMI of 40.0-44.9, adult ([facility] [E66.01, Z68.41]    Discharge Diagnoses:    Problem List as of 6/10/2022 Date Reviewed: 6/9/2022          Codes Class Noted - Resolved    Morbid obesity with BMI of 40.0-44.9, adult ([facility] ICD-10-CM: E66.01, Z68.41  ICD-9-CM: 278.01, V85.41  6/9/2022 - Present        PCOS (polycystic ovarian syndrome) ICD-10-CM: E28.2  ICD-9-CM: 256.4  Unknown - Present    Overview Signed 5/16/2022  4:46 PM by MELANIE Damon     dianosed spring 2022 - no medication as of May 2022             Morbid obesity ([facility] ICD-10-CM: E66.01  ICD-9-CM: 278.01  Unknown - Present        Morbid obesity with body mass index (BMI) of 40.0 to 49.9 ([facility] ICD-10-CM: E66.01  ICD-9-CM: 278.01  Unknown - Present        Functional dyspepsia ICD-10-CM: K30  ICD-9-CM: 536.8  Unknown - Present        Sleep disorder breathing ICD-10-CM: G47.30  ICD-9-CM: 780.59  Unknown - Present    Overview Signed 1/31/2022  9:41 AM by MELANIE Damon     passed sleep study in prep for weight loss surgery              Hypertension ICD-10-CM: I10  ICD-9-CM: 401.9  Unknown - Present    Overview Signed 1/31/2022  9:41 AM by MELANIE Damon     no need for meds as of Jan 2022                   Discharge Condition: [facility] Course: The patient underwent  laparoscopic sleeve gastrectomy  on 6/9/2022. The patient tolerated the procedure well. Vital signs remained stable and the patient was transferred to  3rd floor surgical unit without complications. The patient remained stable throughout the first night post operatively with stable vital signs and adequate urine output and pain control. Pain was controlled with Dilaudid IV and IV Tylenol .   The patient on the first morning post operative was transferred to the radiology suite where they underwent a gastrograffin UGI study which showed no evidence of a leak or stricture. The drain was discontinued on POD # 1 and the patient was started on a bariatric liquid diet with protein shakes. The patient progressed throughout the day and was ambulating well and tolerating their diet. They were therefore discharged home with instructions to notify me with any issues that may arise. Significant Diagnostic Studies:   No results for input(s): HGB, HGBEXT in the last 72 hours. No results for input(s): HCT, HCTEXT in the last 72 hours. Current Discharge Medication List      CONTINUE these medications which have NOT CHANGED    Details   PNV Comb #2-Iron-Omega 3-FA [id number] mg cmpk Take  by mouth daily. ondansetron (ZOFRAN ODT) 8 mg disintegrating tablet Take 1 Tablet by mouth every eight (8) hours as needed for Nausea or Vomiting. Qty: 30 Tablet, Refills: 0             Activity: activity as tolerated with no heavy lifting of greater than 20 pounds. No anti- inflammatory medications. Use stool softeners at home as needed while taking pain medications since they are constipating. Diet: Bariatric liquid diet    Wound Care: Keep wound clean and dry, Reinforce dressing PRN and ice to area for comfort. Do not get wound wet for 2 days.     Follow-up: 14 days with Dr Cyndy Alcala M.D

## 2022-06-13 ENCOUNTER — TELEPHONE (OUTPATIENT)
Dept: SURGERY | Age: 22
End: 2022-06-13

## 2022-06-13 NOTE — TELEPHONE ENCOUNTER
This RN spoke with patient post-operatively. Hydration: Patient has hydrated with 28 ounces as of yesterday. She is experiencing a lot of gassiness, which Gas X is not effective. RN recommended infant gas drops instead. She is also not hydrating frequently throughout her day. She is hydrating about every 10-20 minutes. RN recommended setting her cell phone timer for every five minutes in order to serve as a reminder to hydrate with small amounts throughout her day. RN scheduled her for hydration services for tomorrow. Patient verbalized understanding to the above. Nausea and/or vomitting: None    Pain: Currently managed with Percocet and/or Tylenol    Lovenox injections: Administering every 12 hours, rotating sites. RN reminded patient to complete all injections, in which patient verbalized understanding. Lap sites: No erythema, drainage, and/or swelling    SHEREE drain site: No drainage; dressing removed    BM: Daily    Ambulation: Patient is walking throughout house every hour. IS: Patient continues to use 10x's per hour while awake. She has reached 1,250 mL. Temperature: 97.4 degrees    Medications: (confirmed currently taking)   *Multi-vitamin: yes   *Probiotic: yes   *Prilosec: yes    Questions: None    This RN reminded the patient to contact the office with any questions and/or concerns. RN also reminded patient they will receive another follow-up TC prior to the two week post-op follow-up appointment. Patient verbalized understanding to both. Patient's two week post-op visit is scheduled and was confirmed.

## 2022-06-15 ENCOUNTER — HOSPITAL ENCOUNTER (OUTPATIENT)
Dept: INFUSION THERAPY | Age: 22
Discharge: HOME | End: 2022-06-15
Payer: MEDICAID

## 2022-06-15 VITALS
HEART RATE: 72 BPM | DIASTOLIC BLOOD PRESSURE: 86 MMHG | RESPIRATION RATE: 16 BRPM | SYSTOLIC BLOOD PRESSURE: 145 MMHG | TEMPERATURE: 98.2 F

## 2022-06-15 PROCEDURE — 96360 HYDRATION IV INFUSION INIT: CPT

## 2022-06-15 PROCEDURE — 96366 THER/PROPH/DIAG IV INF ADDON: CPT

## 2022-06-15 PROCEDURE — 96361 HYDRATE IV INFUSION ADD-ON: CPT

## 2022-06-15 PROCEDURE — 74011250636 HC RX REV CODE- 250/636: Performed by: SPECIALIST

## 2022-06-15 PROCEDURE — 96365 THER/PROPH/DIAG IV INF INIT: CPT

## 2022-06-15 PROCEDURE — 74011000250 HC RX REV CODE- 250: Performed by: SPECIALIST

## 2022-06-15 RX ORDER — SODIUM CHLORIDE, SODIUM LACTATE, POTASSIUM CHLORIDE, CALCIUM CHLORIDE 600; 310; 30; 20 MG/100ML; MG/100ML; MG/100ML; MG/100ML
1000 INJECTION, SOLUTION INTRAVENOUS ONCE
Status: COMPLETED | OUTPATIENT
Start: 2022-06-15 | End: 2022-06-15

## 2022-06-15 RX ORDER — SODIUM CHLORIDE 0.9 % (FLUSH) 0.9 %
5-10 SYRINGE (ML) INJECTION AS NEEDED
Status: DISCONTINUED | OUTPATIENT
Start: 2022-06-15 | End: 2022-06-17 | Stop reason: HOSPADM

## 2022-06-15 RX ADMIN — SODIUM CHLORIDE, SODIUM LACTATE, POTASSIUM CHLORIDE, AND CALCIUM CHLORIDE 1000 ML: 600; 310; 30; 20 INJECTION, SOLUTION INTRAVENOUS at 09:45

## 2022-06-15 RX ADMIN — SODIUM CHLORIDE, PRESERVATIVE FREE 10 ML: 5 INJECTION INTRAVENOUS at 09:40

## 2022-06-15 RX ADMIN — FOLIC ACID: 5 INJECTION, SOLUTION INTRAMUSCULAR; INTRAVENOUS; SUBCUTANEOUS at 09:46

## 2022-06-15 NOTE — PROGRESS NOTES
[facility] Progress Note                   Date: Katheryn 15, 2022    Name: Katelyn Rios    MRN: [id number]    : 2000    HYDRATION    Ms. Aguirre to [facility], ambulatory at 0920 accompanied by Ayo. Pt was accessed and education was provided. Ms. Julio Cesar Baca vitals were reviewed and patient was observed for 5 minutes prior to treatment. Visit Vitals  BP (!) 145/86 (BP 1 Location: Left upper arm, BP Patient Position: Sitting)   Pulse 72   Temp 98.2 °F (36.8 °C)   Resp 16   Breastfeeding No       24g IV inserted in patient's Left hand condition patent and no redness x1 attempt. Postive for blood return and flushes without difficulty. Blood draw for BMP and Mag as ordered without complications. 1000 ml LR infused over 2 hours as ordered concurrently with Banana Bag over 2 hours without complications. Ms. Marianne Morales tolerated the infusion, and had no complaints. Patients armband removed and shredded. Ms. Marianne Morales was discharged from [place] [address] in stable condition at 1200. She is to follow up with Dr. Ana Orozco as advised.       Glenna Holcomb RN  Katheryn 15, 2022  9:47 AM

## 2022-06-16 ENCOUNTER — TELEPHONE (OUTPATIENT)
Dept: SURGERY | Age: 22
End: 2022-06-16

## 2022-06-16 NOTE — TELEPHONE ENCOUNTER
This RN spoke with patient post-operatively. Hydration: Patient received hydration at [facility] yesterday. Yesterday, she hydrated with 44 ounces (outside of being in [facility]). Today, she has hydrated with 13 ounces as of 0800. Nausea and/or vomitting: None    Pain: Currently managed with Percocet at night and/or Tylenol during the day    Lovenox injections: Administering every 12 hours, rotating sites. RN reminded patient to complete all injections, in which patient verbalized understanding. Lap sites: No erythema, drainage, and/or swelling    SHEREE drain site: No drainage; dressing removed    BM: Daily    Ambulation: Patient is walking throughout house every hour. IS: Patient continues to use 10x's per hour while awake. She has reached 1,500 mL. Temperature: 98.1 degrees    Medications: (confirmed currently taking)   *Multi-vitamin: yes   *Probiotic: yes   *Prilosec: yes    Questions: None    This RN reminded the patient to contact the office with any questions and/or concerns. Patient verbalized understanding. Patient's two week post-op visit is scheduled and was confirmed.

## 2022-06-21 ENCOUNTER — HOSPITAL ENCOUNTER (OUTPATIENT)
Dept: BARIATRICS/WEIGHT MGMT | Age: 22
Discharge: HOME | End: 2022-06-21

## 2022-06-21 NOTE — PROGRESS NOTES
Spoke with Juan A Castillo today. Pt is approx. 2 weeks S/P laparoscopic sleeve gastrectomy. Tolerating liquid diet very well. Protein shake intake:  Premier (2/d). Fluid intake: water - 60 oz/d. Foods being consumed include SF jell-O, egg drop soup. No complaints. No readmits/surgeries. Pt did receive hydration at [facility] 6/15/22 and reports feeling much better. Wt: 251 lbs. - 6/13/22 (Pt stated, per home scale)  Pre-Op Wt: 268 lbs. EBW: 109 lbs. Physical Activity:  walking dog, ~15 min/d    Supplements:  [x] Maynard's Complete Chewable MVI BID  [x] Probiotic QD  [x] Prilosec QD     Pt given diet education over the phone. Reviewed diet progression for weeks 3-4. Patient appears to have a good understanding of the diet progression, appropriate food choices, and dietary/exercise habits for successful weight loss and adequate nutrition after surgery. Reviewed pp. 32-46 of the patient education book. Discussed: post-op diet progression, including soft/pureed high protein, low-fat, low-sugar food recommendations; proper food group choices;  consumption of food and liquids, and consumption of adequate no-sugar, no caffeine, no carbonation liquids. We reviewed appropriate food choices, meal adaptations (use of smaller dishes/utensils, eating slowly and chewing sufficiently for digestion), cooking techniques, and eating behavior modifications. Discussed intake regimen with 3 meals and 2-3 protein supplements per day. Additionally, intake timing - to include consuming a meal or snack every 3-4 hrs, the 30:30 rule, and having a meal within 2 hours of waking were discussed. Reinforced the importance of continued vitamin & probiotic consumption, adequate fluid intake with goal of 64 fl. oz./d, and adequate protein intake with goal of  g/d. Stressed the importance of getting 30 min.  of physical activity each day, as tolerated, with restricted lifting, to improve post-op weight loss results and bowel function. Pt voiced understanding through repeating the information back to me. Patient's nutrition-related questions answered. Reminded pt that I would be calling them again at 4 wk post-op. Pt provided with RD contact information and encouraged to contact for any nutrition-related questions or concerns.     Fede Koch, KM  6/21/2022

## 2022-06-22 ENCOUNTER — OFFICE VISIT (OUTPATIENT)
Dept: SURGERY | Age: 22
End: 2022-06-22
Payer: MEDICAID

## 2022-06-22 VITALS
HEART RATE: 80 BPM | HEIGHT: 67 IN | OXYGEN SATURATION: 96 % | SYSTOLIC BLOOD PRESSURE: 147 MMHG | BODY MASS INDEX: 38.97 KG/M2 | TEMPERATURE: 97.1 F | DIASTOLIC BLOOD PRESSURE: 70 MMHG | WEIGHT: 248.3 LBS

## 2022-06-22 DIAGNOSIS — Z09 POSTOPERATIVE EXAMINATION: Primary | ICD-10-CM

## 2022-06-22 PROBLEM — K90.9 INTESTINAL MALABSORPTION: Status: ACTIVE | Noted: 2022-06-22

## 2022-06-22 PROBLEM — Z98.84 S/P LAPAROSCOPIC SLEEVE GASTRECTOMY: Status: ACTIVE | Noted: 2022-06-22

## 2022-06-22 PROCEDURE — 99024 POSTOP FOLLOW-UP VISIT: CPT | Performed by: NURSE PRACTITIONER

## 2022-06-22 RX ORDER — OMEPRAZOLE 20 MG/1
20 CAPSULE, DELAYED RELEASE ORAL DAILY
COMMUNITY
End: 2022-09-13 | Stop reason: ALTCHOICE

## 2022-06-22 RX ORDER — CHOLECALCIFEROL (VITAMIN D3) 50 MCG
CAPSULE ORAL
COMMUNITY
End: 2022-09-13 | Stop reason: ALTCHOICE

## 2022-06-22 RX ORDER — URSODIOL 500 MG/1
500 TABLET, FILM COATED ORAL DAILY
Qty: 30 TABLET | Refills: 4 | Status: SHIPPED | OUTPATIENT
Start: 2022-06-22 | End: 2022-07-22

## 2022-06-22 NOTE — PROGRESS NOTES
Subjective:      Cinda Lal is a 25 y.o. female is now 13 days status post laparoscopic sleeve gastrectomy. Doing well overall. She has lost a total of 20 pounds since surgery. Body mass index is 38.89 kg/m². Currently on a liquid diet without difficulty. Taking in 60oz fluid daily. Sources of protein include protein shakes. No structured exercise, but increasing activity. Bowel movements are regular. The patient is not having any pain. . The patient is compliant with multivitamins. Surgery related complications: NA.  Liver bx report reviewed with patient. Stomach bx report reviewed with patient. Weight Loss Metrics 6/22/2022 6/9/2022 6/1/2022 5/16/2022 3/2/2022 1/31/2022   Today's Wt 248 lb 4.8 oz 263 lb 9.6 oz 267 lb 267 lb 12.8 oz 265 lb 14.4 oz 263 lb 14.4 oz   BMI 38.89 kg/m2 41.29 kg/m2 41.82 kg/m2 41.94 kg/m2 41.65 kg/m2 41.33 kg/m2          Comorbidities:    Hypertension: improved, no medications prior to surgery  Diabetes: not applicable  Obstructive Sleep Apnea: not applicable  Hyperlipidemia: not applicable  Stress Urinary Incontinence: not applicable  Gastroesophageal Reflux: not applicable  Weight related arthropathy:not applicable    Denies diagnosis of COVID-19 since surgery.      Patient Active Problem List   Diagnosis Code    Morbid obesity ([facility] [address] E66.01    Morbid obesity with body mass index (BMI) of 40.0 to 49.9 ([facility]) E66.01    Functional dyspepsia K30    Sleep disorder breathing G47.30    Hypertension I10    PCOS (polycystic ovarian syndrome) E28.2    Morbid obesity with BMI of 40.0-44.9, adult ([facility]) E66.01, Z68.41    Intestinal malabsorption K90.9    S/P laparoscopic sleeve gastrectomy Z98.84        Past Medical History:   Diagnosis Date    Functional dyspepsia     avoids trigger foods    Hypertension     no need for meds as of Jan 2022    Morbid obesity ([facility] [address]     Morbid obesity with body mass index (BMI) of 40.0 to 49.9 ([facility])     PCOS (polycystic ovarian syndrome) dianosed spring 2022 - no medication as of May 2022    Sleep disorder breathing     passed sleep study in prep for weight loss surgery        Past Surgical History:   Procedure Laterality Date    HX WISDOM TEETH EXTRACTION      ME LAP, KRISTOFER RESTRICT PROC, LONGITUDINAL GASTRECTOMY  06/09/2022    Dr Kathrene Ganser       Current Outpatient Medications   Medication Sig Dispense Refill    B.animalis,bifid,infantis,long (Probiotic 4X) 10-15 mg TbEC Take  by mouth.  multivitamin with iron (FLINTSTONES) chewable tablet Take 1 Tablet by mouth daily.  omeprazole (PRILOSEC) 20 mg capsule Take 20 mg by mouth daily.  ondansetron (ZOFRAN ODT) 8 mg disintegrating tablet Take 1 Tablet by mouth every eight (8) hours as needed for Nausea or Vomiting. 30 Tablet 0    PNV Comb #2-Iron-Omega 3-FA [id number] mg cmpk Take  by mouth daily.  (Patient not taking: Reported on 6/22/2022)         Allergies   Allergen Reactions    Pollen Extracts Other (comments)       Review of Symptoms:       General - No history or complaints of unexpected fever or chills  Head/Neck - No history or complaints of headache or dizziness  Cardiac - No history or complaints of chest pain, palpitations, or shortness of breath  Pulmonary - No history or complaints of shortness of breath or productive cough  Gastrointestinal - as noted above  Genitourinary - No history or complaints of hematuria/dysuria or renal lithiasis  Musculoskeletal - No history or complaints of joint  muscular weakness  Hematologic - No history of any bleeding episodes  Neurologic - No history or complaints of  migraine headaches or neurologic symptoms        Objective:     Visit Vitals  BP (!) 147/70   Pulse 80   Temp 97.1 °F (36.2 °C)   Ht 5' 7\" (1.702 m)   Wt 112.6 kg (248 lb 4.8 oz)   LMP 06/03/2022   SpO2 96%   BMI 38.89 kg/m²       General:  alert, cooperative, no distress, appears stated age   Chest: lungs clear to auscultation, breath sounds equal and symmetric, no rhonchi, rales or wheezes, no accessory muscle use   Cor:   Regular rate and rhythm, S1S2 present or without murmur or extra heart sounds   Abdomen: soft, bowel sounds active, non-tender, no masses or organomegaly   Incisions:   healing well, no drainage, no erythema, no hernia, no seroma, no swelling, no dehiscence, incision well approximated     A:  LIVER WEDGE, BIOPSY:        MILD STEATOSIS (APPROXIMATELY 5%). NO SIGNIFICANT FIBROSIS. B:  PREPYLORIC STOMACH, BIOPSY:        GASTRIC MUCOSA WITHOUT SIGNIFICANT PATHOLOGY. NO HELICOBACTER ORGANISMS SEEN. C:  PORTION OF STOMACH, GASTRIC SLEEVE RESECTION:        SECTIONS OF STOMACH WITHOUT SIGNIFICANT PATHOLOGY. Assessment:   History of Morbid obesity, status post laparoscopic sleeve gastrectomy. Doing well postoperatively. Plan:     1. Increase activity to the goal of 30 minutes daily and Increase fluids  2. Advance diet to soft solid phase. Reminded to measure portions, continue high protein, low carbohydrate diet. Reminded to eat regularly, to eat slowly & not to drink with meals. Refer to the handbook given in class. 3. Continue multivitamin   4. Continue current medications and follow up with PCP for management of regimen. 5. Encouraged to attend support group   6. I have discussed this plan with patient and they verbalized understanding  7. Follow up in 2 weeks or sooner if patient has questions, concerns or worsening of condition, if unable to reach our office, patient should report to the ED. 8. Ms. Carlos Giang has a reminder for a \"due or due soon\" health maintenance. I have asked that she contact her primary care provider for a follow-up on this health maintenance.

## 2022-07-11 ENCOUNTER — HOSPITAL ENCOUNTER (OUTPATIENT)
Dept: BARIATRICS/WEIGHT MGMT | Age: 22
Discharge: HOME | End: 2022-07-11

## 2022-07-11 ENCOUNTER — TELEPHONE (OUTPATIENT)
Dept: BARIATRICS/WEIGHT MGMT | Age: 22
End: 2022-07-11

## 2022-07-11 NOTE — TELEPHONE ENCOUNTER
7/11/2022:  Contacted patient today at 4:20 PM  in reference to: 1-mo. phone call to assess post-op diet tolerance and discuss diet progression. Patient was left a voicemail instructing them to return call. My contact information was provided.     Suzette Blair RD

## 2022-07-11 NOTE — PROGRESS NOTES
Patient is a 25 y.o. female approx. 1 mo S/P laparoscopic sleeve gastrectomy. Visit Vitals  Ht 5' 7\" (1.702 m)   Wt 107 kg (236 lb)   BMI 36.96 kg/m²   Pt current weight stated, visit was virtual.      Pre-op Wt: 268 lbs  EBW: 109 lbs    Pt has lost 30 lbs since surgery on 6/22/22. Pt has lost 28% EBW. Pt is currently on a soft food diet without difficulty. Patient is hydrating with water, protein shake (Premier, 1/d)  - ~61 oz/d daily. Patient is tolerating the following sources of protein:  turkey sausage, eggs, shrimp, tuna (stated felt like it got stuck in chest and went down very slowly), swai fish. Reports eating 3 meals/d, meals take approximately 20-25 min to complete. Pt states she does not measure foods, is using a 9\" divided plate and does not eat full portion/section of protein at her meals. Pt states she does not like the protein shakes, as she has never been one to like much in the way of sweets; has only tried Premier, Equate High Performance (generic Premier), and Premier clear. We discussed trying tart flavors and unflavored protein. Emphasized the importance of drinking at least 2 shakes daily to ensure intake of  g/d. Patient is currently riding stationary bike for exercise, 15 min/day,  3x/wk. Patient [x] has  [] has not had any readmissions, reoperations, complications, ED visits, nor IVF at [facility] during her first post-op month. Pt [x]is []is not taking her Prilosec. Supplements:  [x] Flintstones Complete MVI  BID  [x] Probiotic       Reviewed the following with patient:  1. Advance diet to solid phase. Reminded to measure portions, continue high protein, low carbohydrate diet. Reminded to eat regularly, to eat slowly & not to drink with meals. Refer to the handbook and video.   2. Continue multi-vitamin with iron and add the following supplements  (as listed in handbook):  o Calcium citrate: 1,500 mg/d, taken in doses of 500 mg TID, 2 hours apart from MVI doses  o Vitamin B-complex: one a day  o Vitamin B12: 1,000 mcg daily taken sublingually  o Vitamin D3: 3,000 IU per day  3. Can stop probiotic, if desired, or needed for economical reasons. 4. Continue cardio exercise and add resistance exercises. Discussed with patient. Minimum of 30 minutes of exercise daily, five days a week. 5. Encouraged to attend monthly support group. 6. Start 500mg Otoniel Forte today, stop after 6 months out from surgery. 7. Continue current medications and follow up with PCP for management of regimen. I have discussed this plan with patient, and they verbalized understanding. One-month nutrition video to include the above mentioned education and link for support group e-mailed to patient. RD contact information provided for nutrition-related questions. Follow-up with provider at three-month appointment or sooner if patient has questions, concerns, or worsening of condition. If unable to reach our office, patient should report to the ED.      Lyudmila Torres RD

## 2022-07-12 VITALS — BODY MASS INDEX: 37.04 KG/M2 | WEIGHT: 236 LBS | HEIGHT: 67 IN

## 2022-09-13 ENCOUNTER — OFFICE VISIT (OUTPATIENT)
Dept: SURGERY | Age: 22
End: 2022-09-13
Payer: MEDICAID

## 2022-09-13 VITALS
SYSTOLIC BLOOD PRESSURE: 155 MMHG | OXYGEN SATURATION: 99 % | WEIGHT: 217 LBS | TEMPERATURE: 97.3 F | HEIGHT: 67 IN | DIASTOLIC BLOOD PRESSURE: 71 MMHG | BODY MASS INDEX: 34.06 KG/M2 | HEART RATE: 75 BPM

## 2022-09-13 DIAGNOSIS — K90.9 INTESTINAL MALABSORPTION, UNSPECIFIED TYPE: Primary | ICD-10-CM

## 2022-09-13 DIAGNOSIS — Z98.84 S/P LAPAROSCOPIC SLEEVE GASTRECTOMY: ICD-10-CM

## 2022-09-13 PROBLEM — E66.01 MORBID OBESITY WITH BMI OF 40.0-44.9, ADULT (HCC): Status: RESOLVED | Noted: 2022-06-09 | Resolved: 2022-09-13

## 2022-09-13 PROBLEM — E66.01 MORBID OBESITY WITH BMI OF 40.0-44.9, ADULT: Status: RESOLVED | Noted: 2022-06-09 | Resolved: 2022-09-13

## 2022-09-13 PROCEDURE — 99214 OFFICE O/P EST MOD 30 MIN: CPT | Performed by: NURSE PRACTITIONER

## 2022-09-13 RX ORDER — IBUPROFEN 200 MG
CAPSULE ORAL DAILY
COMMUNITY

## 2022-09-13 RX ORDER — URSODIOL 500 MG/1
500 TABLET, FILM COATED ORAL 2 TIMES DAILY
COMMUNITY

## 2022-09-13 RX ORDER — MULTIVIT WITH MINERALS/HERBS
1 TABLET ORAL DAILY
COMMUNITY

## 2022-09-13 RX ORDER — ACETAMINOPHEN 500 MG
TABLET ORAL DAILY
COMMUNITY

## 2022-09-13 RX ORDER — MAGNESIUM 200 MG
1000 TABLET ORAL DAILY
COMMUNITY

## 2022-09-13 NOTE — PROGRESS NOTES
Subjective:      Rocio Carlson is a 25 y.o. female is now 3 months status post laparoscopic sleeve gastrectomy. Doing well overall. She has lost a total of 51 pounds since surgery. Body mass index is 33.99 kg/m². Has lost 40% of EBW. Currently on a solid food diet without difficulty, reports no real issues and denies vomiting, abdominal pain, difficulty swallowing, nausea and reflux. The patients diet choices have been reviewed today and counseling was given. Fluid intake: 50 oz      Protein intake: no shakes, but uses powder in her foods; chicken, beef      Meals/day: 3-4     30 min of activity 3 days a week, including cardio. Bowel movements are alternating constipation and regularity. Using Miralax about 2x/week. The patient is not having any pain. . The patient is compliant with multivitamins, calcium, Vit D and B12 supplements.      Weight Loss Metrics 9/13/2022 7/11/2022 6/22/2022 6/9/2022 6/1/2022 5/16/2022 3/2/2022   Today's Wt 217 lb 236 lb 248 lb 4.8 oz 263 lb 9.6 oz 267 lb 267 lb 12.8 oz 265 lb 14.4 oz   BMI 33.99 kg/m2 36.96 kg/m2 38.89 kg/m2 41.29 kg/m2 41.82 kg/m2 41.94 kg/m2 41.65 kg/m2          Comorbidities:    Hypertension: improved, no medications prior to surgery  Diabetes: not applicable  Obstructive Sleep Apnea: not applicable  Hyperlipidemia: not applicable  Stress Urinary Incontinence: not applicable  Gastroesophageal Reflux: not applicable  Weight related arthropathy:not applicable     Patient Active Problem List   Diagnosis Code    Morbid obesity ([facility] [address] E66.01    Morbid obesity with body mass index (BMI) of 40.0 to 49.9 ([facility]) E66.01    Functional dyspepsia K30    Sleep disorder breathing G47.30    Hypertension I10    PCOS (polycystic ovarian syndrome) E28.2    Morbid obesity with BMI of 40.0-44.9, adult ([facility]) E66.01, Z68.41    Intestinal malabsorption K90.9    S/P laparoscopic sleeve gastrectomy Z98.84        Past Medical History:   Diagnosis Date    Functional dyspepsia avoids trigger foods    Hypertension     no need for meds as of Jan 2022    Morbid obesity ([address]     Morbid obesity with body mass index (BMI) of 40.0 to 49.9 ([facility])     PCOS (polycystic ovarian syndrome)     dianosed spring 2022 - no medication as of May 2022    Sleep disorder breathing     passed sleep study in prep for weight loss surgery        Past Surgical History:   Procedure Laterality Date    HX WISDOM TEETH EXTRACTION      [place] LAP, KRISTOFER RESTRICT PROC, LONGITUDINAL GASTRECTOMY  06/09/2022    Dr Deanna Gautam       Current Outpatient Medications   Medication Sig Dispense Refill    cyanocobalamin (VITAMIN B-12) 1,000 mcg sublingual tablet Take 1,000 mcg by mouth daily. cholecalciferol (VITAMIN D3) (2,000 UNITS /50 MCG) cap capsule Take  by mouth daily. calcium citrate 200 mg (950 mg) tablet Take  by mouth daily. b complex vitamins tablet Take 1 Tablet by mouth daily. ursodioL (ACTIGALL) 500 mg tablet Take 500 mg by mouth two (2) times a day. multivitamin with iron (FLINTSTONES) chewable tablet Take 1 Tablet by mouth daily. omeprazole (PRILOSEC) 20 mg capsule Take 20 mg by mouth daily.          Allergies   Allergen Reactions    Pollen Extracts Other (comments)       Review of Symptoms:       General - No history or complaints of unexpected fever or chills  Head/Neck - No history or complaints of headache or dizziness  Cardiac - No history or complaints of chest pain, palpitations, or shortness of breath  Pulmonary - No history or complaints of shortness of breath or productive cough  Gastrointestinal - as noted above  Genitourinary - No history or complaints of hematuria/dysuria or renal lithiasis  Musculoskeletal - No history or complaints of joint  muscular weakness  Hematologic - No history of any bleeding episodes  Neurologic - No history or complaints of  migraine headaches or neurologic symptoms        Objective:     Visit Vitals  BP (!) 155/71   Pulse 75   Temp 97.3 °F (36.3 °C)   Ht 5' 7\" (1.702 m)   Wt 98.4 kg (217 lb)   SpO2 99%   BMI 33.99 kg/m²       Physical Examination:     General appearance:  alert, cooperative, no distress, appears stated age   Mental status   alert, oriented to person, place, and time   Neck  supple, no significant adenopathy     Lymphatics  no palpable lymphadenopathy, no hepatosplenomegaly   Chest  clear to auscultation, no wheezes, rales or rhonchi, symmetric air entry   Heart  normal rate, regular rhythm, normal S1, S2, no murmurs, rubs, clicks or gallops    Abdomen: soft, nontender, nondistended, no masses or organomegaly   Incision:  Well healed, no hernias      Neurological  alert, oriented, normal speech, no focal findings or movement disorder noted   Musculoskeletal no joint tenderness, deformity or swelling   Extremities peripheral pulses normal, no pedal edema, no clubbing or cyanosis   Skin normal coloration and turgor, no rashes, no suspicious skin lesions noted        Assessment and Plan:   Intestinal malabsorption  continue required Vitamins: B12, B complex, D, iron, calcium, multivitamin  S/p laparoscopic bariatric surgery,laparoscopic sleeve gastrectomy , history of morbid obesity. Reviewed weight loss progress and is doing well. Do recommend using food tracking ivis to ensure adequate protein and caloric intake. Aim for 80-90 g protein daily and 800-1000 calories. Keep daily carbs below 50g. Continue to increase exercise. Sleep goal is 7-9 hours each night. Patient education given on the effects of sleep deprivation on weight control. Discussed patients weight loss goals and dietary choices in relation to goals. Reminded to measure portions, continue high protein, low carbohydrate diet. Reminded to eat regularly, to eat slowly & not to drink with meals. Continue cardio exercise and add resistance exercises. 60-90 minutes of aerobic activity 5 days a week and strength training 2 days each week.   Encouraged to attend support group Required fluid intake is >64oz daily of decaffeinated sugar free beverages. 3. Constipation - Increase Miralax to BID and can use milk of magnesia 1-2x/week; consider fiber supplement    Patient to complete labs before next visit. Lab slip given today. I have discussed this plan with patient and they verbalized understanding    30 minutes spent with patient. Follow up in 3 months or sooner if patient has questions, concerns or worsening of condition, if unable to reach our office, patient should report to the ED. Ms. Darron Matamoros has a reminder for a \"due or due soon\" health maintenance. I have asked that she contact her primary care provider for a follow-up on this health maintenance.

## 2022-12-14 ENCOUNTER — HOSPITAL ENCOUNTER (OUTPATIENT)
Dept: LAB | Age: 22
Discharge: HOME | End: 2022-12-14

## 2022-12-14 LAB — SENTARA SPECIMEN COL,SENBCF: NORMAL

## 2022-12-14 PROCEDURE — 99001 SPECIMEN HANDLING PT-LAB: CPT

## 2022-12-16 ENCOUNTER — OFFICE VISIT (OUTPATIENT)
Dept: SURGERY | Age: 22
End: 2022-12-16
Payer: MEDICAID

## 2022-12-16 VITALS
HEART RATE: 65 BPM | OXYGEN SATURATION: 99 % | HEIGHT: 67 IN | DIASTOLIC BLOOD PRESSURE: 73 MMHG | WEIGHT: 202.6 LBS | SYSTOLIC BLOOD PRESSURE: 136 MMHG | TEMPERATURE: 97.1 F | BODY MASS INDEX: 31.8 KG/M2

## 2022-12-16 DIAGNOSIS — E55.9 HYPOVITAMINOSIS D: ICD-10-CM

## 2022-12-16 DIAGNOSIS — Z98.84 S/P LAPAROSCOPIC SLEEVE GASTRECTOMY: ICD-10-CM

## 2022-12-16 DIAGNOSIS — K90.9 INTESTINAL MALABSORPTION, UNSPECIFIED TYPE: Primary | ICD-10-CM

## 2022-12-16 RX ORDER — LANOLIN ALCOHOL/MO/W.PET/CERES
CREAM (GRAM) TOPICAL
COMMUNITY

## 2022-12-16 NOTE — PROGRESS NOTES
Subjective:     Salvador He  is a 25 y.o. female who presents for follow-up about 6 months following laparoscopic sleeve gastrectomy. Surgery related complication: NA. She has lost a total of 66 pounds since surgery. Body mass index is 31.73 kg/m². Lucy Rast Loss of EBW 52%. The patient presents today to assess their progress toward their weight loss goal & to address any issues that may be present:   She is tolerating solids without difficulty, reports no real issues and denies vomiting, abdominal pain, difficulty swallowing, nausea and reflux. The patients diet choices have been reviewed today and counseling was given. Fluid intake: good      Protein intake: 50g isopure protein powder + food; chicken, fish, steak      Meals/day: 4    Taking vitamins as recommended. The patient's exercise level: moderately active. Changes in her medical history and medications have been reviewed.       Comorbidities:    Hypertension: resolved  Diabetes: not applicable  Obstructive Sleep Apnea: not applicable  Hyperlipidemia: not applicable  Stress Urinary Incontinence: not applicable  Gastroesophageal Reflux: not applicable  Weight related arthropathy:not applicable      Patient Active Problem List   Diagnosis Code    Functional dyspepsia K30    Sleep disorder breathing G47.30    Hypertension I10    PCOS (polycystic ovarian syndrome) E28.2    Intestinal malabsorption K90.9    S/P laparoscopic sleeve gastrectomy Z98.84     Past Medical History:   Diagnosis Date    Functional dyspepsia     avoids trigger foods    Hypertension     no need for meds as of Jan 2022    Morbid obesity ([address]     Morbid obesity with body mass index (BMI) of 40.0 to 49.9 (HCC)     PCOS (polycystic ovarian syndrome)     dianosed spring 2022 - no medication as of May 2022    Sleep disorder breathing     passed sleep study in prep for weight loss surgery      Past Surgical History:   Procedure Laterality Date    HX WISDOM TEETH EXTRACTION IA LAP, KRISTOFER RESTRICT PROC, LONGITUDINAL GASTRECTOMY  06/09/2022    Dr Jose Martin Stewart     Current Outpatient Medications   Medication Sig Dispense Refill    multivitamin, tx-iron-ca-min (THERA-M w/ IRON) 9 mg iron-400 mcg tab tablet Take 1 Tablet by mouth daily. ferrous sulfate 325 mg (65 mg iron) tablet Take  by mouth Daily (before breakfast). cyanocobalamin (VITAMIN B-12) 1,000 mcg sublingual tablet Take 1,000 mcg by mouth daily. cholecalciferol (VITAMIN D3) (2,000 UNITS /50 MCG) cap capsule Take  by mouth daily. calcium citrate 200 mg (950 mg) tablet Take  by mouth daily. b complex vitamins tablet Take 1 Tablet by mouth daily. ursodioL (ACTIGALL) 500 mg tablet Take 500 mg by mouth two (2) times a day.          Review of Systems:  General - Denies fatigue, fever, chills  Cardiac - Denies chest pain, palpitations, shortness of breath  Pulmonary - Denies shortness of breath or productive cough  Gastrointestinal - as noted above  Musculoskeletal - Denies joint or muscle weakness, pain, stiffness  Hematologic - Denies abnormal bleeding or bruising  Neurologic - Denies weakness, paralysis, numbness, tingling    Objective:     Visit Vitals  /73 (BP 1 Location: Left upper arm, BP Patient Position: Sitting, BP Cuff Size: Adult long)   Pulse 65   Temp 97.1 °F (36.2 °C)   Ht 5' 7\" (1.702 m)   Wt 91.9 kg (202 lb 9.6 oz)   SpO2 99%   BMI 31.73 kg/m²        Physical Exam:      General appearance:  alert, cooperative, no distress, appears stated age   Mental status   alert, oriented to person, place, and time   Neck  supple, no significant adenopathy     Lymphatics  no palpable lymphadenopathy, no hepatosplenomegaly   Chest  clear to auscultation, no wheezes, rales or rhonchi, symmetric air entry   Heart  normal rate, regular rhythm, normal S1, S2, no murmurs, rubs, clicks or gallops    Abdomen: soft, nontender, nondistended, no masses or organomegaly   Incision:  Well healed, no hernias Neurological  alert, oriented, normal speech, no focal findings or movement disorder noted   Musculoskeletal no joint tenderness, deformity or swelling   Extremities peripheral pulses normal, no pedal edema, no clubbing or cyanosis   Skin normal coloration and turgor, no rashes, no suspicious skin lesions noted            Labs:     Recent Results (from the past 2016 hour(s))   [address] [facility]. Collection Time: 12/14/22 10:02 AM   Result Value Ref Range    [facility] Specimens collected/sent to [facility]         No results for input(s): VITD3 in the last 60406 hours. Reviewed labs in Care Everywhere from 12/14/22:  Hgb 11.8  Hct 38.8  Ferritin 31  Iron 85  LFTs WNL  Crt 0.8  B12 >2000  Folate 9.30  Vitamin D 31.2    Assessment and Plan:   Intestinal malabsorption  continue required Vitamins: B12, B complex, D, iron, calcium, multivitamin  S/p laparoscopic bariatric surgery,laparoscopic sleeve gastrectomy , history of morbid obesity. Reviewed weight loss progress and is doing very well. Do recommend using food tracking ivis to ensure adequate protein and caloric intake. Aim for 80-90 g protein daily. Continue to increase exercise and add strength training. Sleep goal is 7-9 hours each night. Patient education given on the effects of sleep deprivation on weight control. Discussed patients weight loss goals and dietary choices in relation to goals. Reminded to measure portions, continue high protein, low carbohydrate diet. Reminded to eat regularly, to eat slowly & not to drink with meals. Continue cardio exercise and add resistance exercises. 60-90 minutes of aerobic activity 5 days a week and strength training 2 days each week. Encouraged to attend support group   Required fluid intake is >64oz daily of decaffeinated sugar free beverages. 3. Hypovitaminosis D - increase D3 to 5000 units daily    Patient to complete labs before next visit. Lab slip given today.   I have discussed this plan with patient and they verbalized understanding    30 minutes spent with patient    Follow up in 6 months or sooner if patient has questions, concerns or worsening of condition, if unable to reach our office, patient should report to the ED. Ms. Myah James has a reminder for a \"due or due soon\" health maintenance. I have asked that she contact her primary care provider for a follow-up on this health maintenance.

## 2022-12-16 NOTE — PATIENT INSTRUCTIONS
Switch to prenatal vitamin with 18mg iron twice a day  Add 500mg Vitamin C 30 minutes before           Increase to 5000 units D3                                                        Patient Instructions      Remember hydration goals - minimum of 64 ounces of liquids per day (dehydration is the number one reason for hospital readmission). Continue to monitor carbohydrate and protein intake you need a minimum of  Grams of protein daily- remember to keep your total carbohydrates to 50 grams or less per day for best results. Continue to work towards exercise goals - 60-90 minutes, 5 times a week minimum of deliberate, aerobic exercise is the ultimate goal with strength training 2 times each week. Refer to [employer] for  information. Remember to take vitamins as directed. Attend support group the 2nd Thursday of each month. 6.  Constipation: Milk of Magnesia is for immediate relief only. Miralax is to be used every day if constipation is a chronic problem. 7.  Diarrhea: patients will occasionally develop lactose intolerance after surgery. Check to see if your protein shake has whey in it. If it does try a protein powder or drink that does not have whey and stop all yogurts, cheeses and milks to see if the diarrhea goes away. 8.  If you have had labs drawn. We will only call you if you have abnormal results. Otherwise you can access the lab results in \"mychart\". You will only need the access code the first time you sign on. 9.  Call us at [phone] or email us through [email or web address] with questions,     concerns or worsening of condition, we have someone on call 24 hours a day. If you are unable to reach our office, you are to go to your Primary Care Physician or the Emergency Department.      NOTE TO GASTRIC BYPASS PATIENTS:  (SAME APPLIES TO GASTRIC SLEEVE PATIENTS FOR FIRST TWO MONTHS)  Remember that for the rest of your life, you are not able to take the following:  - NSAIDs (ibuprofen, goody powder, BC powder, Motrin, Advil, Mobic, Voltaren, Excedrin, etc.)  - Steroid pills or injections  - Smoke (cigarettes or recreational drugs)  - Alcohol  Use of any of the above may cause ulcers in your stomach which may perforate causing a medical emergency and surgery. Speak to our medical staff if another medical provider requires you to take steroids or NSAIDs. Supplement Resource Guide    Importance of Protein:   Maintains lean body mass, produces antibodies to fight off infections, heals wounds, minimizes hair loss, helps to give you energy, helps with satiety, and keeping you full between meals. Importance of Calcium:  Needed for healthy bones and teeth, normal blood clotting, and nervous system functioning, higher risk of osteoporosis and bone disease with non-compliance. Importance of Multivitamins: Many functions. Supply you with extra nutrients that you may be missing from food. May lead to iron deficiency anemia, weakness, fatigue, and many other symptoms with non-compliance. Importance of B Vitamins:  Important for red blood cell formation, metabolism, energy, and helps to maintain a healthy nervous system. Protein Supplement  Find one you like now. Use immediately after surgery. Look for:  35-50g protein each day from your protein supplement once you reach the progression diet. 0-3 g fat per serving  0-3 g sugar per serving    Protein drinks should be split in separate dosages. Recommend: Lifelong  1 year + Calcium Supplement:     Start taking within a month after surgery. Look for: Calcium Citrate Plus D (1500 mg per day)  Recommend: Citracal     .            Avoid chocolate chewable calcium. Can use chewable bariatric or [employer] brand or similar chewable. The body cannot absorb more than 500-600 mg of calcium at a time.       Take for Life Multi-vitamin Supplement:      Start immediately after surgery: any complete chewable, such as: [place]s Complete chewables. Avoid [place] sours or gummies. They lack iron and other important nutrients and also have added sugar. Continue with chewable vitamin or change to adult complete multivitamin one month after surgery. Menstruating women can take a prenatal vitamin. Make sure has at least 18 mg iron and 817-457 mcg folic acid   Vitamin E68, B Complex Vitamin, and Biotin  Start taking within a month after surgery. Vitamin B12:  1000 mcg of Vitamin B12 three times weekly    Must take sublingually (meaning you take it under your tongue) or in a liquid drop form for easy absorption. B Complex Vitamin: Take a pill or liquid drop form once daily. Biotin: This vitamin can help prevent hair loss. Recommend 5mg   (5000 mcg) a day  Biotin is Optional           Learning About Being Physically Active  What is physical activity? Being physically active means doing any kind of activity that gets your body moving. The types of physical activity that can help you get fit and stay healthy include:  Aerobic or \"cardio\" activities. These make your heart beat faster and make you breathe harder, such as brisk walking, riding a bike, or running. They strengthen your heart and lungs and build up your endurance. Strength training activities. These make your muscles work against, or \"resist,\" something. Examples include lifting weights or doing push-ups. These activities help tone and strengthen your muscles and bones. Stretches. These let you move your joints and muscles through their full range of motion. Stretching helps you be more flexible. Reaching a balance between these three types of physical activity is important because each one contributes to your overall fitness. What are the benefits of being active? Being active is one of the best things you can do for your health. It helps you to:  Feel stronger and have more energy to do all the things you like to do.   Focus better at school or work. Feel, think, and sleep better. Reach and stay at a healthy weight. Lose fat and build lean muscle. Lower your risk for serious health problems, including diabetes, heart attack, high blood pressure, and some cancers. Keep your heart, lungs, bones, muscles, and joints strong and healthy. How can you make being active part of your life? Start slowly. Make it your long-term goal to get at least 30 minutes of exercise on most days of the week. Walking is a good choice. You also may want to do other activities, such as running, swimming, cycling, or playing tennis or team sports. Pick activities that you like--ones that make your heart beat faster, your muscles stronger, and your muscles and joints more flexible. If you find more than one thing you like doing, do them all. You don't have to do the same thing every day. Get your heart pumping every day. Any activity that makes your heart beat faster and keeps it at that rate for a while counts. Here are some great ways to get your heart beating faster:  Go for a brisk walk, run, or bike ride. Go for a hike or swim. Go in-line skating. Play a game of touch football, basketball, or soccer. Ride a bike. Play tennis or racquetball. Climb stairs. Even some household chores can be aerobic--just do them at a faster pace. Vacuuming, raking or mowing the lawn, sweeping the garage, and washing and waxing the car all can help get your heart rate up. Strengthen your muscles during the week. You don't have to lift heavy weights or grow big, bulky muscles to get stronger. Doing a few simple activities that make your muscles work against, or \"resist,\" something can help you get stronger. For example, you can:  Do push-ups or sit-ups, which use your own body weight as resistance. Lift weights or dumbbells or use stretch bands at home or in a gym or community center. Stretch your muscles often. Stretching will help you as you become more active.  It can help you stay flexible, loosen tight muscles, and avoid injury. It can also help improve your balance and posture and can be a great way to relax. Be sure to stretch the muscles you'll be using when you work out. It's best to warm your muscles slightly before you stretch them. Walk or do some other light aerobic activity for a few minutes, and then start stretching. When you stretch your muscles:  Do it slowly. Stretching is not about going fast or making sudden movements. Don't push or bounce during a stretch. Hold each stretch for at least 15 to 30 seconds, if you can. You should feel a stretch in the muscle, but not pain. Breathe out as you do the stretch. Then breathe in as you hold the stretch. Don't hold your breath. If you're worried about how more activity might affect your health, have a checkup before you start. Follow any special advice your doctor gives you for getting a smart start. Where can you learn more? Go to http://www.gray.com/  Enter [id number] in the search box to learn more about \"Learning About Being Physically Active. \"  Current as of: January 26, 2022               Content Version: 13.4  © 7141-4106 [employer], [employer]. Care instructions adapted under license by [employer] (which disclaims liability or warranty for this information). If you have questions about a medical condition or this instruction, always ask your healthcare professional. [place] [address] any warranty or liability for your use of this information.

## 2022-12-17 LAB — VITAMIN B1, WHOLE BLOOD, 66250: 74 NMOL/L (ref 66.5–200)

## 2023-10-16 NOTE — PATIENT INSTRUCTIONS
[employer] or My Fitness Pal [place]  80-90g protein  800-1000 calories   Keep carbs below 50g                                                          Patient Instructions      Remember hydration goals - minimum of 64 ounces of liquids per day (dehydration is the number one reason for hospital readmission). Continue to monitor carbohydrate and protein intake you need a minimum of  Grams of protein daily- remember to keep your total carbohydrates to 50 grams or less per day for best results. Continue to work towards exercise goals - 60-90 minutes, 5 times a week minimum of deliberate, aerobic exercise is the ultimate goal with strength training 2 times each week. Refer to [employer] for  information. Remember to take vitamins as directed. Attend support group the 2nd Thursday of each month. 6.  Constipation: Milk of Magnesia is for immediate relief only. Miralax is to be used every day if constipation is a chronic problem. 7.  Diarrhea: patients will occasionally develop lactose intolerance after surgery. Check to see if your protein shake has whey in it. If it does try a protein powder or drink that does not have whey and stop all yogurts, cheeses and milks to see if the diarrhea goes away. 8.  If you have had labs drawn. We will only call you if you have abnormal results. Otherwise you can access the lab results in \"mychart\". You will only need the access code the first time you sign on. 9.  Call us at [phone] or email us through [email or web address]" with questions,     concerns or worsening of condition, we have someone on call 24 hours a day. If you are unable to reach our office, you are to go to your Primary Care Physician or the Emergency Department.      NOTE TO GASTRIC BYPASS PATIENTS:  (SAME APPLIES TO GASTRIC SLEEVE PATIENTS FOR FIRST TWO MONTHS)  Remember that for the rest of your life, you are not able to take the following:  - NSAIDs (ibuprofen, goody powder, BC powder, Motrin, Advil, Mobic, Voltaren, Excedrin, etc.)  - Steroid pills or injections  - Smoke (cigarettes or recreational drugs)  - Alcohol  Use of any of the above may cause ulcers in your stomach which may perforate causing a medical emergency and surgery. Speak to our medical staff if another medical provider requires you to take steroids or NSAIDs. Supplement Resource Guide    Importance of Protein:   Maintains lean body mass, produces antibodies to fight off infections, heals wounds, minimizes hair loss, helps to give you energy, helps with satiety, and keeping you full between meals. Importance of Calcium:  Needed for healthy bones and teeth, normal blood clotting, and nervous system functioning, higher risk of osteoporosis and bone disease with non-compliance. Importance of Multivitamins: Many functions. Supply you with extra nutrients that you may be missing from food. May lead to iron deficiency anemia, weakness, fatigue, and many other symptoms with non-compliance. Importance of B Vitamins:  Important for red blood cell formation, metabolism, energy, and helps to maintain a healthy nervous system. Protein Supplement  Find one you like now. Use immediately after surgery. Look for:  35-50g protein each day from your protein supplement once you reach the progression diet. 0-3 g fat per serving  0-3 g sugar per serving    Protein drinks should be split in separate dosages. Recommend: Lifelong  1 year + Calcium Supplement:     Start taking within a month after surgery. Look for: Calcium Citrate Plus D (1500 mg per day)  Recommend: Citracal     .            Avoid chocolate chewable calcium. Can use chewable bariatric or [employer] brand or similar chewable. The body cannot absorb more than 500-600 mg of calcium at a time.       Take for Life Multi-vitamin Supplement:      Start immediately after surgery: any complete chewable, such as: [place]s Complete chewables. Avoid [place] sours or gummies. They lack iron and other important nutrients and also have added sugar. Continue with chewable vitamin or change to adult complete multivitamin one month after surgery. Menstruating women can take a prenatal vitamin. Make sure has at least 18 mg iron and 801-870 mcg folic acid   Vitamin W67, B Complex Vitamin, and Biotin  Start taking within a month after surgery. Vitamin B12:  1000 mcg of Vitamin B12 three times weekly    Must take sublingually (meaning you take it under your tongue) or in a liquid drop form for easy absorption. B Complex Vitamin: Take a pill or liquid drop form once daily. Biotin: This vitamin can help prevent hair loss. Recommend 5mg   (5000 mcg) a day  Biotin is Optional           Learning About Being Physically Active  What is physical activity? Being physically active means doing any kind of activity that gets your body moving. The types of physical activity that can help you get fit and stay healthy include:  Aerobic or \"cardio\" activities. These make your heart beat faster and make you breathe harder, such as brisk walking, riding a bike, or running. They strengthen your heart and lungs and build up your endurance. Strength training activities. These make your muscles work against, or \"resist,\" something. Examples include lifting weights or doing push-ups. These activities help tone and strengthen your muscles and bones. Stretches. These let you move your joints and muscles through their full range of motion. Stretching helps you be more flexible. What are the benefits of being active? Being active is one of the best things you can do for your health. It helps you to:  Feel stronger and have more energy to do all the things you like to do. Focus better at school or work. Feel, think, and sleep better. Reach and stay at a healthy weight. Lose fat and build lean muscle.   Lower your risk for serious health problems, including diabetes, heart attack, high blood pressure, and some cancers. Keep your heart, lungs, bones, muscles, and joints strong and healthy. How can you make being active part of your life? Start slowly. Make it your long-term goal to get at least 30 minutes of exercise on most days of the week. Walking is a good choice. You also may want to do other activities, such as running, swimming, cycling, or playing tennis or team sports. Pick activities that you like--ones that make your heart beat faster, your muscles stronger, and your muscles and joints more flexible. If you find more than one thing you like doing, do them all. You don't have to do the same thing every day. Get your heart pumping every day. Any activity that makes your heart beat faster and keeps it at that rate for a while counts. Here are some great ways to get your heart beating faster:  Go for a brisk walk, run, or bike ride. Go for a hike or swim. Go in-line skating. Play a game of touch football, basketball, or soccer. Ride a bike. Play tennis or racquetball. Climb stairs. Even some household chores can be aerobic--just do them at a faster pace. Vacuuming, raking or mowing the lawn, sweeping the garage, and washing and waxing the car all can help get your heart rate up. Strengthen your muscles during the week. You don't have to lift heavy weights or grow big, bulky muscles to get stronger. Doing a few simple activities that make your muscles work against, or \"resist,\" something can help you get stronger. For example, you can:  Do push-ups or sit-ups, which use your own body weight as resistance. Lift weights or dumbbells or use stretch bands at home or in a gym or community center. Stretch your muscles often. Stretching will help you as you become more active. It can help you stay flexible, loosen tight muscles, and avoid injury. It can also help improve your balance and posture and can be a great way to relax.   Be sure to stretch the muscles you'll be using when you work out. It's best to warm your muscles slightly before you stretch them. Walk or do some other light aerobic activity for a few minutes, and then start stretching. When you stretch your muscles:  Do it slowly. Stretching is not about going fast or making sudden movements. Don't push or bounce during a stretch. Hold each stretch for at least 15 to 30 seconds, if you can. You should feel a stretch in the muscle, but not pain. Breathe out as you do the stretch. Then breathe in as you hold the stretch. Don't hold your breath. If you're worried about how more activity might affect your health, have a checkup before you start. Follow any special advice your doctor gives you for getting a smart start. Where can you learn more? Go to http://www.gray.com/  Enter [id number] in the search box to learn more about \"Learning About Being Physically Active. \"  Current as of: May 12, 2021               Content Version: 13.2  © 2006-2022 Healthwise[employer] Inc[employer]. Care instructions adapted under license by [employer] (which disclaims liability or warranty for this information). If you have questions about a medical condition or this instruction, always ask your healthcare professional. [address] any warranty or liability for your use of this information. Opt out

## 2025-01-02 ENCOUNTER — HOSPITAL ENCOUNTER (INPATIENT)
Facility: HOSPITAL | Age: 25
LOS: 3 days | Discharge: HOME | DRG: 560 | End: 2025-01-05
Attending: OBSTETRICS & GYNECOLOGY | Admitting: OBSTETRICS & GYNECOLOGY
Payer: MEDICAID

## 2025-01-02 PROBLEM — Z34.90 ENCOUNTER FOR INDUCTION OF LABOR: Status: ACTIVE | Noted: 2025-01-02

## 2025-01-02 PROBLEM — O13.3 GESTATIONAL HYPERTENSION WITHOUT SIGNIFICANT PROTEINURIA IN THIRD TRIMESTER: Status: ACTIVE | Noted: 2025-01-02

## 2025-01-02 PROBLEM — D50.9 IRON DEFICIENCY ANEMIA: Status: ACTIVE | Noted: 2025-01-02

## 2025-01-02 LAB
ABO + RH BLD: NORMAL
BLOOD GROUP ANTIBODIES SERPL: NORMAL
ERYTHROCYTE [DISTWIDTH] IN BLOOD BY AUTOMATED COUNT: 14.6 % (ref 11.6–14.5)
HCT VFR BLD AUTO: 34.6 % (ref 35–45)
HGB BLD-MCNC: 12 G/DL (ref 12–16)
MCH RBC QN AUTO: 29.9 PG (ref 24–34)
MCHC RBC AUTO-ENTMCNC: 34.7 G/DL (ref 31–37)
MCV RBC AUTO: 86.1 FL (ref 78–100)
NRBC # BLD: 0 K/UL (ref 0–0.01)
NRBC BLD-RTO: 0 PER 100 WBC
PLATELET # BLD AUTO: 185 K/UL (ref 135–420)
PMV BLD AUTO: 11.3 FL (ref 9.2–11.8)
RBC # BLD AUTO: 4.02 M/UL (ref 4.2–5.3)
SPECIMEN EXP DATE BLD: NORMAL
WBC # BLD AUTO: 9.2 K/UL (ref 4.6–13.2)

## 2025-01-02 PROCEDURE — C1726 CATH, BAL DIL, NON-VASCULAR: HCPCS

## 2025-01-02 PROCEDURE — 85027 COMPLETE CBC AUTOMATED: CPT

## 2025-01-02 PROCEDURE — 6360000002 HC RX W HCPCS: Performed by: ADVANCED PRACTICE MIDWIFE

## 2025-01-02 PROCEDURE — 86592 SYPHILIS TEST NON-TREP QUAL: CPT

## 2025-01-02 PROCEDURE — 7210000100 HC LABOR FEE PER 1 HR

## 2025-01-02 PROCEDURE — 1100000000 HC RM PRIVATE

## 2025-01-02 PROCEDURE — 86900 BLOOD TYPING SEROLOGIC ABO: CPT

## 2025-01-02 PROCEDURE — 59025 FETAL NON-STRESS TEST: CPT

## 2025-01-02 PROCEDURE — 86901 BLOOD TYPING SEROLOGIC RH(D): CPT

## 2025-01-02 PROCEDURE — 86850 RBC ANTIBODY SCREEN: CPT

## 2025-01-02 PROCEDURE — 6370000000 HC RX 637 (ALT 250 FOR IP): Performed by: ADVANCED PRACTICE MIDWIFE

## 2025-01-02 PROCEDURE — 99212 OFFICE O/P EST SF 10 MIN: CPT

## 2025-01-02 RX ORDER — HYDROMORPHONE HYDROCHLORIDE 1 MG/ML
1 INJECTION, SOLUTION INTRAMUSCULAR; INTRAVENOUS; SUBCUTANEOUS EVERY 4 HOURS PRN
Status: DISCONTINUED | OUTPATIENT
Start: 2025-01-02 | End: 2025-01-04

## 2025-01-02 RX ORDER — MISOPROSTOL 200 UG/1
400 TABLET ORAL PRN
Status: DISCONTINUED | OUTPATIENT
Start: 2025-01-02 | End: 2025-01-05 | Stop reason: HOSPADM

## 2025-01-02 RX ORDER — METHYLERGONOVINE MALEATE 0.2 MG/ML
200 INJECTION INTRAVENOUS PRN
Status: DISCONTINUED | OUTPATIENT
Start: 2025-01-02 | End: 2025-01-03 | Stop reason: HOSPADM

## 2025-01-02 RX ORDER — ONDANSETRON 2 MG/ML
4 INJECTION INTRAMUSCULAR; INTRAVENOUS EVERY 6 HOURS PRN
Status: DISCONTINUED | OUTPATIENT
Start: 2025-01-02 | End: 2025-01-05 | Stop reason: HOSPADM

## 2025-01-02 RX ORDER — SODIUM CHLORIDE, SODIUM LACTATE, POTASSIUM CHLORIDE, AND CALCIUM CHLORIDE .6; .31; .03; .02 G/100ML; G/100ML; G/100ML; G/100ML
1000 INJECTION, SOLUTION INTRAVENOUS PRN
Status: DISCONTINUED | OUTPATIENT
Start: 2025-01-02 | End: 2025-01-03 | Stop reason: HOSPADM

## 2025-01-02 RX ORDER — ZOLPIDEM TARTRATE 5 MG/1
5 TABLET ORAL NIGHTLY PRN
Status: DISCONTINUED | OUTPATIENT
Start: 2025-01-02 | End: 2025-01-05 | Stop reason: HOSPADM

## 2025-01-02 RX ORDER — SODIUM CHLORIDE, SODIUM LACTATE, POTASSIUM CHLORIDE, AND CALCIUM CHLORIDE .6; .31; .03; .02 G/100ML; G/100ML; G/100ML; G/100ML
500 INJECTION, SOLUTION INTRAVENOUS ONCE
Status: DISCONTINUED | OUTPATIENT
Start: 2025-01-03 | End: 2025-01-05 | Stop reason: HOSPADM

## 2025-01-02 RX ORDER — TRANEXAMIC ACID 10 MG/ML
1000 INJECTION, SOLUTION INTRAVENOUS
Status: ACTIVE | OUTPATIENT
Start: 2025-01-02 | End: 2025-01-03

## 2025-01-02 RX ORDER — ONDANSETRON 4 MG/1
4 TABLET, ORALLY DISINTEGRATING ORAL EVERY 6 HOURS PRN
Status: DISCONTINUED | OUTPATIENT
Start: 2025-01-02 | End: 2025-01-05 | Stop reason: HOSPADM

## 2025-01-02 RX ORDER — SODIUM CHLORIDE 9 MG/ML
INJECTION, SOLUTION INTRAVENOUS PRN
Status: DISCONTINUED | OUTPATIENT
Start: 2025-01-02 | End: 2025-01-03 | Stop reason: HOSPADM

## 2025-01-02 RX ORDER — LIDOCAINE HYDROCHLORIDE 10 MG/ML
30 INJECTION, SOLUTION EPIDURAL; INFILTRATION; INTRACAUDAL; PERINEURAL PRN
Status: DISCONTINUED | OUTPATIENT
Start: 2025-01-02 | End: 2025-01-03 | Stop reason: HOSPADM

## 2025-01-02 RX ORDER — SODIUM CHLORIDE 0.9 % (FLUSH) 0.9 %
5-40 SYRINGE (ML) INJECTION PRN
Status: DISCONTINUED | OUTPATIENT
Start: 2025-01-02 | End: 2025-01-03 | Stop reason: HOSPADM

## 2025-01-02 RX ORDER — CARBOPROST TROMETHAMINE 250 UG/ML
250 INJECTION, SOLUTION INTRAMUSCULAR PRN
Status: DISCONTINUED | OUTPATIENT
Start: 2025-01-02 | End: 2025-01-03 | Stop reason: HOSPADM

## 2025-01-02 RX ORDER — SODIUM CHLORIDE 0.9 % (FLUSH) 0.9 %
5-40 SYRINGE (ML) INJECTION EVERY 12 HOURS SCHEDULED
Status: DISCONTINUED | OUTPATIENT
Start: 2025-01-02 | End: 2025-01-03 | Stop reason: HOSPADM

## 2025-01-02 RX ORDER — SODIUM CHLORIDE, SODIUM LACTATE, POTASSIUM CHLORIDE, CALCIUM CHLORIDE 600; 310; 30; 20 MG/100ML; MG/100ML; MG/100ML; MG/100ML
INJECTION, SOLUTION INTRAVENOUS CONTINUOUS
Status: DISCONTINUED | OUTPATIENT
Start: 2025-01-02 | End: 2025-01-03 | Stop reason: HOSPADM

## 2025-01-02 RX ORDER — TERBUTALINE SULFATE 1 MG/ML
0.25 INJECTION, SOLUTION SUBCUTANEOUS
Status: ACTIVE | OUTPATIENT
Start: 2025-01-02 | End: 2025-01-03

## 2025-01-02 RX ADMIN — HYDROMORPHONE HYDROCHLORIDE 1 MG: 1 INJECTION, SOLUTION INTRAMUSCULAR; INTRAVENOUS; SUBCUTANEOUS at 22:49

## 2025-01-02 RX ADMIN — Medication 25 MCG: at 20:25

## 2025-01-02 RX ADMIN — Medication 50 MCG: at 23:57

## 2025-01-02 ASSESSMENT — PAIN SCALES - GENERAL: PAINLEVEL_OUTOF10: 8

## 2025-01-02 ASSESSMENT — PAIN DESCRIPTION - LOCATION: LOCATION: ABDOMEN

## 2025-01-02 ASSESSMENT — PAIN DESCRIPTION - DESCRIPTORS: DESCRIPTORS: CRAMPING

## 2025-01-02 ASSESSMENT — PAIN - FUNCTIONAL ASSESSMENT: PAIN_FUNCTIONAL_ASSESSMENT: ACTIVITIES ARE NOT PREVENTED

## 2025-01-02 NOTE — H&P
History & Physical    Name: Lu Collier MRN: [id number]  SSN: [id number]    YOB: 2000  Age: 24 y.o.  Sex: female        Subjective:     Estimated Date of Delivery: 25  OB History    Para Term  AB Living   1   0 0 0 0   SAB IAB Ectopic Molar Multiple Live Births                    # Outcome Date GA Lbr Cuate/2nd Weight Sex Type Anes PTL Lv   1 Current                Lu Collier is 24 y.o., , at 38w3d presenting to L&D for induction of labor due to GHTN. Prenatal course has been complicated by GHTN, Hx gastric bypass, and anemia. She is GBS positve. Please see prenatal records for details.    Past Medical History:   Diagnosis Date    Functional dyspepsia     avoids trigger foods    Hypertension     no need for meds as of 2022    Morbid obesity     Morbid obesity with body mass index (BMI) of 40.0 to 49.9     PCOS (polycystic ovarian syndrome)     dianosed spring 2022 - no medication as of May 2022    Sleep disorder breathing     passed sleep study in prep for weight loss surgery      Past Surgical History:   Procedure Laterality Date    LAP, NEW RESTRICT PROC, LONGITUDINAL GASTRECTOMY  2022    Dr Salma DRUMMONDDOM TOOTH EXTRACTION       Social History     Occupational History    Not on file   Tobacco Use    Smoking status: Never    Smokeless tobacco: Never   Substance and Sexual Activity    Alcohol use: Not Currently    Drug use: Never    Sexual activity: Yes     Birth control/protection: None     Family History   Problem Relation Age of Onset    Hypertension Mother     Hypertension Father     Diabetes Father        Allergies   Allergen Reactions    Pollen Extract Other (See Comments)     Prior to Admission medications    Medication Sig Start Date End Date Taking? Authorizing Provider   ferrous sulfate (IRON 325) 325 (65 Fe) MG tablet Take by mouth every morning (before breakfast)   Yes Automatic Reconciliation, Ar   calcium citrate (CALCITRATE) 950 (200 Ca)

## 2025-01-03 ENCOUNTER — ANESTHESIA (OUTPATIENT)
Facility: HOSPITAL | Age: 25
End: 2025-01-03
Payer: MEDICAID

## 2025-01-03 ENCOUNTER — ANESTHESIA EVENT (OUTPATIENT)
Facility: HOSPITAL | Age: 25
End: 2025-01-03
Payer: MEDICAID

## 2025-01-03 LAB — RPR SER QL: NONREACTIVE

## 2025-01-03 PROCEDURE — 2500000003 HC RX 250 WO HCPCS: Performed by: ANESTHESIOLOGY

## 2025-01-03 PROCEDURE — 7210000100 HC LABOR FEE PER 1 HR

## 2025-01-03 PROCEDURE — 6370000000 HC RX 637 (ALT 250 FOR IP): Performed by: ADVANCED PRACTICE MIDWIFE

## 2025-01-03 PROCEDURE — 6370000000 HC RX 637 (ALT 250 FOR IP)

## 2025-01-03 PROCEDURE — 51702 INSERT TEMP BLADDER CATH: CPT

## 2025-01-03 PROCEDURE — 3E033VJ INTRODUCTION OF OTHER HORMONE INTO PERIPHERAL VEIN, PERCUTANEOUS APPROACH: ICD-10-PCS | Performed by: ADVANCED PRACTICE MIDWIFE

## 2025-01-03 PROCEDURE — 7100000001 HC PACU RECOVERY - ADDTL 15 MIN

## 2025-01-03 PROCEDURE — 2580000003 HC RX 258: Performed by: ADVANCED PRACTICE MIDWIFE

## 2025-01-03 PROCEDURE — 7100000000 HC PACU RECOVERY - FIRST 15 MIN

## 2025-01-03 PROCEDURE — 3700000156 HC EPIDURAL ANESTHESIA

## 2025-01-03 PROCEDURE — 6360000002 HC RX W HCPCS: Performed by: ANESTHESIOLOGY

## 2025-01-03 PROCEDURE — 1100000000 HC RM PRIVATE

## 2025-01-03 PROCEDURE — 6360000002 HC RX W HCPCS: Performed by: ADVANCED PRACTICE MIDWIFE

## 2025-01-03 PROCEDURE — 7220000101 HC DELIVERY VAGINAL/SINGLE

## 2025-01-03 PROCEDURE — 3700000025 EPIDURAL BLOCK: Performed by: ANESTHESIOLOGY

## 2025-01-03 RX ORDER — DIPHENHYDRAMINE HCL 25 MG
25 CAPSULE ORAL EVERY 6 HOURS PRN
Status: DISCONTINUED | OUTPATIENT
Start: 2025-01-03 | End: 2025-01-03 | Stop reason: HOSPADM

## 2025-01-03 RX ORDER — DOCUSATE SODIUM 100 MG/1
100 CAPSULE, LIQUID FILLED ORAL 2 TIMES DAILY
Status: DISCONTINUED | OUTPATIENT
Start: 2025-01-03 | End: 2025-01-05 | Stop reason: HOSPADM

## 2025-01-03 RX ORDER — DIPHENHYDRAMINE HYDROCHLORIDE 50 MG/ML
12.5 INJECTION INTRAMUSCULAR; INTRAVENOUS EVERY 6 HOURS PRN
Status: DISCONTINUED | OUTPATIENT
Start: 2025-01-03 | End: 2025-01-03 | Stop reason: HOSPADM

## 2025-01-03 RX ORDER — ONDANSETRON 4 MG/1
4 TABLET, ORALLY DISINTEGRATING ORAL EVERY 6 HOURS PRN
Status: DISCONTINUED | OUTPATIENT
Start: 2025-01-03 | End: 2025-01-05 | Stop reason: HOSPADM

## 2025-01-03 RX ORDER — ACETAMINOPHEN 500 MG
1000 TABLET ORAL EVERY 8 HOURS SCHEDULED
Status: DISCONTINUED | OUTPATIENT
Start: 2025-01-03 | End: 2025-01-05 | Stop reason: HOSPADM

## 2025-01-03 RX ORDER — SODIUM CHLORIDE 9 MG/ML
INJECTION, SOLUTION INTRAVENOUS PRN
Status: DISCONTINUED | OUTPATIENT
Start: 2025-01-03 | End: 2025-01-05 | Stop reason: HOSPADM

## 2025-01-03 RX ORDER — LIDOCAINE HYDROCHLORIDE AND EPINEPHRINE 15; 5 MG/ML; UG/ML
INJECTION, SOLUTION EPIDURAL
Status: DISCONTINUED | OUTPATIENT
Start: 2025-01-03 | End: 2025-01-03 | Stop reason: SDUPTHER

## 2025-01-03 RX ORDER — IBUPROFEN 400 MG/1
800 TABLET, FILM COATED ORAL EVERY 8 HOURS SCHEDULED
Status: DISCONTINUED | OUTPATIENT
Start: 2025-01-03 | End: 2025-01-05

## 2025-01-03 RX ORDER — ONDANSETRON 2 MG/ML
4 INJECTION INTRAMUSCULAR; INTRAVENOUS EVERY 6 HOURS PRN
Status: DISCONTINUED | OUTPATIENT
Start: 2025-01-03 | End: 2025-01-05 | Stop reason: HOSPADM

## 2025-01-03 RX ORDER — EPHEDRINE SULFATE 5 MG/ML
10 INJECTION INTRAVENOUS
Status: DISCONTINUED | OUTPATIENT
Start: 2025-01-03 | End: 2025-01-03 | Stop reason: HOSPADM

## 2025-01-03 RX ORDER — LEVOTHYROXINE SODIUM 100 UG/1
200 TABLET ORAL DAILY
Status: CANCELLED | OUTPATIENT
Start: 2025-01-04

## 2025-01-03 RX ORDER — FENTANYL/ROPIVACAINE/NS/PF 2MCG/ML-.2
10 PLASTIC BAG, INJECTION (ML) INJECTION CONTINUOUS
Status: DISCONTINUED | OUTPATIENT
Start: 2025-01-03 | End: 2025-01-03 | Stop reason: HOSPADM

## 2025-01-03 RX ORDER — EPHEDRINE SULFATE 5 MG/ML
5 INJECTION INTRAVENOUS PRN
Status: DISCONTINUED | OUTPATIENT
Start: 2025-01-04 | End: 2025-01-03 | Stop reason: HOSPADM

## 2025-01-03 RX ORDER — SODIUM CHLORIDE 0.9 % (FLUSH) 0.9 %
5-40 SYRINGE (ML) INJECTION PRN
Status: DISCONTINUED | OUTPATIENT
Start: 2025-01-03 | End: 2025-01-05 | Stop reason: HOSPADM

## 2025-01-03 RX ORDER — ONDANSETRON 2 MG/ML
4 INJECTION INTRAMUSCULAR; INTRAVENOUS EVERY 6 HOURS PRN
Status: DISCONTINUED | OUTPATIENT
Start: 2025-01-03 | End: 2025-01-03 | Stop reason: HOSPADM

## 2025-01-03 RX ORDER — MAGNESIUM CARB/ALUMINUM HYDROX 105-160MG
TABLET,CHEWABLE ORAL
Status: COMPLETED
Start: 2025-01-03 | End: 2025-01-03

## 2025-01-03 RX ORDER — ESCITALOPRAM OXALATE 10 MG/1
10 TABLET ORAL NIGHTLY
Status: CANCELLED | OUTPATIENT
Start: 2025-01-03

## 2025-01-03 RX ORDER — SODIUM CHLORIDE 0.9 % (FLUSH) 0.9 %
5-40 SYRINGE (ML) INJECTION EVERY 12 HOURS SCHEDULED
Status: DISCONTINUED | OUTPATIENT
Start: 2025-01-03 | End: 2025-01-05 | Stop reason: HOSPADM

## 2025-01-03 RX ORDER — ROPIVACAINE HYDROCHLORIDE 2 MG/ML
INJECTION, SOLUTION EPIDURAL; INFILTRATION; PERINEURAL
Status: DISCONTINUED | OUTPATIENT
Start: 2025-01-03 | End: 2025-01-03 | Stop reason: SDUPTHER

## 2025-01-03 RX ORDER — NALOXONE HYDROCHLORIDE 0.4 MG/ML
INJECTION, SOLUTION INTRAMUSCULAR; INTRAVENOUS; SUBCUTANEOUS PRN
Status: DISCONTINUED | OUTPATIENT
Start: 2025-01-03 | End: 2025-01-03 | Stop reason: HOSPADM

## 2025-01-03 RX ADMIN — ONDANSETRON 4 MG: 2 INJECTION INTRAMUSCULAR; INTRAVENOUS at 16:39

## 2025-01-03 RX ADMIN — Medication 12 ML/HR: at 16:29

## 2025-01-03 RX ADMIN — ROPIVACAINE HYDROCHLORIDE 3 ML: 2 INJECTION EPIDURAL; INFILTRATION; PERINEURAL at 09:24

## 2025-01-03 RX ADMIN — ROPIVACAINE HYDROCHLORIDE 4 ML: 2 INJECTION EPIDURAL; INFILTRATION; PERINEURAL at 09:26

## 2025-01-03 RX ADMIN — LIDOCAINE HYDROCHLORIDE AND EPINEPHRINE 3 ML: 15; 5 INJECTION, SOLUTION EPIDURAL at 09:21

## 2025-01-03 RX ADMIN — SODIUM CHLORIDE, POTASSIUM CHLORIDE, SODIUM LACTATE AND CALCIUM CHLORIDE: 600; 310; 30; 20 INJECTION, SOLUTION INTRAVENOUS at 00:18

## 2025-01-03 RX ADMIN — DOCUSATE SODIUM 100 MG: 100 CAPSULE, LIQUID FILLED ORAL at 21:17

## 2025-01-03 RX ADMIN — Medication 2 MILLI-UNITS/MIN: at 10:25

## 2025-01-03 RX ADMIN — Medication 50 MCG: at 04:06

## 2025-01-03 RX ADMIN — MINERAL OIL 30 ML: 1000 SOLUTION ORAL at 17:05

## 2025-01-03 RX ADMIN — ACETAMINOPHEN 1000 MG: 500 TABLET ORAL at 21:17

## 2025-01-03 RX ADMIN — Medication 12 ML/HR: at 09:30

## 2025-01-03 RX ADMIN — SODIUM CHLORIDE 2.5 MILLION UNITS: 9 INJECTION, SOLUTION INTRAVENOUS at 14:04

## 2025-01-03 RX ADMIN — ROPIVACAINE HYDROCHLORIDE 3 ML: 2 INJECTION EPIDURAL; INFILTRATION; PERINEURAL at 09:25

## 2025-01-03 RX ADMIN — ONDANSETRON 4 MG: 2 INJECTION INTRAMUSCULAR; INTRAVENOUS at 00:18

## 2025-01-03 RX ADMIN — SODIUM CHLORIDE, POTASSIUM CHLORIDE, SODIUM LACTATE AND CALCIUM CHLORIDE: 600; 310; 30; 20 INJECTION, SOLUTION INTRAVENOUS at 09:36

## 2025-01-03 RX ADMIN — PENICILLIN G POTASSIUM 5 MILLION UNITS: 5000000 INJECTION, POWDER, FOR SOLUTION INTRAMUSCULAR; INTRAVENOUS at 10:20

## 2025-01-03 NOTE — PROGRESS NOTES
LIA Esquivel CNM at bedside for cook balloon placement. Legs in stirrups. Cook balloon and 25 mcg Cytotec placed and inflated with 80/80.

## 2025-01-03 NOTE — PROGRESS NOTES
OB Labor Note    Assessment:   IOL for GHTN at 38.4     Epidural in place   Pitocin infusing per protocol     Plan:   Continue to monitor labor   Continue pitocin augmentation   GBS prophylaxis   Anticipate   AROM after next dose of PNC    Subjective:   Pt. does not have any complaints.  Pt. is feeling contractions.  Pt. is feeling fetal movement.      Objective:     Vitals:    25 0719   BP: 132/77   Pulse: 81   Resp: 16   Temp: 98.1 °F (36.7 °C)   SpO2: 100%      Fetal Heart Rate  Mode: External US  Baseline Rate: 125 bpm  Baseline Classification: Normal  Variability: Moderate  Pattern: Accelerations, Variable decelerations  Multiple birth?: No    EFM:  Baseline: 130  Variability: moderate  Acceleration: +  Deceleration: none  Contractions:every 3-4 min   Cat: 1    GRUPO Nix  1/3/2025 10:13 AM

## 2025-01-03 NOTE — PROCEDURES
Client presents at 38w3d for GHTN IOL. Cook ripening balloon to be placed with cytotec followed by induction of labor with pitocin. Procedure, risks, and benefits of CRB explained to client. All questions answered and verbal consent obtained. Patient was placed in lithotomy position. Speculum placed in vagina. Cook catheter with stylet guide placed in cervical os without difficulty. Uterine balloon was tested with 50 cc then inflated to a total of 80 cc of sterile NS. Gentle traction to ensure correct placement  of uterine balloon followed by testing and filling of vaginal balloon with 80 cc of NS. Cyotec placed in posterior vaginal vault. Patient tolerated procedure well. Fetal tracing remains reassuring.     Plan:  IV pain meds if needed  Cytotec Q4hrs  Remove catheter in 12 hours, unless spontaneously expelled sooner, and start pitocin titration.   Anticipate       ELSIE Ceron, CNM  2025

## 2025-01-03 NOTE — PROGRESS NOTES
0910- Dr. Arredondo at bedside for epidural placement. Procedure explained to include risks and benefits. Verbalizes understanding. All questions answered.      Sitting up on side of bed. Position reviewed.     Time out 0915     Epidural placed 0919     Test dose 0921     Loading dosed. 0924     Assisted back to bed after epidural placement. Tolerated procedure well. Vital signs stable. Monitors adjusted.       1015- CNM Abbey and M Jennifer at bedside. SVE 4/50/-2 with bulging bag.     1700- Pt feeling urge to push. ILANAM Abbey and GRUPO at bedside.     1703- Pt began pushing  1717- time of birth  1720 cord cut  1722 placenta  QBL- 200

## 2025-01-03 NOTE — PROGRESS NOTES
OB Labor Note    Assessment:   IOL for GHTN at 38.4                Epidural in place              Pitocin infusing per protocol    AROM clear fluid    Plan:   Continue to monitor labor progression   Continue pitocin augmentation   Continue GBS prophylaxis   Anticipate     Subjective:   Pt. does not have any complaints.  Pt. is not feeling contractions.  Pt. is feeling fetal movement.      Objective:     Vitals:    25 1356   BP: 129/72   Pulse: 60   Resp:    Temp:    SpO2: 99%      Fetal Heart Rate  Mode: External US  Baseline Rate: 120 bpm  Baseline Classification: Normal  Variability: Moderate  Pattern: Accelerations, Early decelerations  Patient Feels Fetal Movement: Yes  Interventions: Ultrasound Adjusted  Multiple birth?: No    EFM:  Baseline 135  Variability moderate  Accelerations+   Decelerations early  Contractions:q 3 min  Cat:1    SVE /-2    GRUPO Nix  1/3/2025 2:56 PM

## 2025-01-03 NOTE — L&D DELIVERY SUMMARY NOTE
Vaginal Delivery Procedure Note    Name: Lu Collier   Medical Record Number: [id number]   YOB: 2000  Today's Date: January 3, 2025        Procedure: VAGINAL DELIVERY without suction or forceps     Anesthesia:  epidural    Extra Procedure Details:       Estimated Blood Loss: 200 ml    Fetal Description: stafford female     Anterior shoulder: right    Umbilical Cord: 3 vessels present    Episiotomy: no   Tear: yes  Type: periurethral   Degree: n/a degree   Repair:   4-0 SH             Cord Blood Results:   Information for the patient's :  Francisca Collier [[id number]]   @AB@    Birth Information:   Information for the patient's :  Francisca Collier [[id number]]         Apgars: 8,9 at 1 and 5 min respectively    Specimens: Placenta was not sent     Placenta: Spontaneous with assist and apparently intact on exam          Complications:  none     Birth Weight: see baby part of chart    Mother's Condition: Good and Stable  Baby's Condition: Good and Stable  Sponge and needle count:  is correct    I was present for the delivery.  GRUPO Nix Delivered for our practice.    Signed: Jennifer LOMBARDO     January 3, 2025

## 2025-01-03 NOTE — ANESTHESIA PROCEDURE NOTES
Epidural Block    Patient location during procedure: OB  Start time: 1/3/2025 9:10 AM  Reason for block: labor epidural  Staffing  Performed: anesthesiologist   Anesthesiologist: Joel Arredondo MD  Performed by: Joel Arredondo MD  Authorized by: Joel Arredondo MD    Epidural  Patient position: sitting  Prep: ChloraPrep  Patient monitoring: continuous pulse ox and frequent blood pressure checks  Approach: midline  Location: L3-4  Injection technique: EDGAR saline  Provider prep: mask, sterile gloves and sterile gown  Needle  Needle type: Tuohy   Needle gauge: 17 G  Needle length: 3.5 in  Needle insertion depth: 7 cm  Catheter type: end hole  Catheter size: 19 G  Catheter at skin depth: 12 cm  Test dose: negativeCatheter Secured: tape  Assessment  Hemodynamics: stable  Attempts: 2 (1st attempt ---> os)  Outcomes: uncomplicated and patient tolerated procedure well  Preanesthetic Checklist  Completed: patient identified, IV checked, site marked, risks and benefits discussed, surgical/procedural consents, equipment checked, pre-op evaluation, timeout performed, anesthesia consent given, oxygen available and monitors applied/VS acknowledged

## 2025-01-03 NOTE — ANESTHESIA PRE PROCEDURE
reviewed and Nursing notes reviewed  Airway: Mallampati: III  TM distance: >3 FB   Neck ROM: full  Mouth opening: > = 3 FB   Dental:          Pulmonary:Negative Pulmonary ROS                              Cardiovascular:  Exercise tolerance: no interval change  (+) hypertension:                  Neuro/Psych:   Negative Neuro/Psych ROS              GI/Hepatic/Renal:   (+) GERD:          Endo/Other: Negative Endo/Other ROS                    Abdominal:             Vascular: negative vascular ROS.         Other Findings:         Anesthesia Plan      epidural     ASA 2             Anesthetic plan and risks discussed with patient.                Risks of epidural, including but not limited to bleeding, infection, back pain, headache, seizure, nerve injury, and block failure discussed with patient and accepted.  REYNOLD SORENSON MD   1/3/2025

## 2025-01-04 LAB
BASOPHILS # BLD: 0 K/UL (ref 0–0.1)
BASOPHILS NFR BLD: 0 % (ref 0–2)
DIFFERENTIAL METHOD BLD: ABNORMAL
EOSINOPHIL # BLD: 0.1 K/UL (ref 0–0.4)
EOSINOPHIL NFR BLD: 1 % (ref 0–5)
ERYTHROCYTE [DISTWIDTH] IN BLOOD BY AUTOMATED COUNT: 14.3 % (ref 11.6–14.5)
HCT VFR BLD AUTO: 33.3 % (ref 35–45)
HGB BLD-MCNC: 11.5 G/DL (ref 12–16)
IMM GRANULOCYTES # BLD AUTO: 0 K/UL (ref 0–0.04)
IMM GRANULOCYTES NFR BLD AUTO: 0 % (ref 0–0.5)
LYMPHOCYTES # BLD: 2.2 K/UL (ref 0.9–3.6)
LYMPHOCYTES NFR BLD: 23 % (ref 21–52)
MCH RBC QN AUTO: 29.9 PG (ref 24–34)
MCHC RBC AUTO-ENTMCNC: 34.5 G/DL (ref 31–37)
MCV RBC AUTO: 86.5 FL (ref 78–100)
MONOCYTES # BLD: 0.6 K/UL (ref 0.05–1.2)
MONOCYTES NFR BLD: 6 % (ref 3–10)
NEUTS SEG # BLD: 6.4 K/UL (ref 1.8–8)
NEUTS SEG NFR BLD: 69 % (ref 40–73)
NRBC # BLD: 0 K/UL (ref 0–0.01)
NRBC BLD-RTO: 0 PER 100 WBC
PLATELET # BLD AUTO: 184 K/UL (ref 135–420)
PMV BLD AUTO: 11.2 FL (ref 9.2–11.8)
RBC # BLD AUTO: 3.85 M/UL (ref 4.2–5.3)
WBC # BLD AUTO: 9.4 K/UL (ref 4.6–13.2)

## 2025-01-04 PROCEDURE — 6370000000 HC RX 637 (ALT 250 FOR IP): Performed by: ADVANCED PRACTICE MIDWIFE

## 2025-01-04 PROCEDURE — 85025 COMPLETE CBC W/AUTO DIFF WBC: CPT

## 2025-01-04 PROCEDURE — 1100000000 HC RM PRIVATE

## 2025-01-04 PROCEDURE — 6360000002 HC RX W HCPCS: Performed by: ADVANCED PRACTICE MIDWIFE

## 2025-01-04 PROCEDURE — 36415 COLL VENOUS BLD VENIPUNCTURE: CPT

## 2025-01-04 RX ORDER — IBUPROFEN 800 MG/1
800 TABLET, FILM COATED ORAL EVERY 8 HOURS SCHEDULED
Qty: 120 TABLET | Refills: 3 | Status: SHIPPED | OUTPATIENT
Start: 2025-01-04

## 2025-01-04 RX ORDER — OXYCODONE HYDROCHLORIDE 5 MG/1
5 TABLET ORAL EVERY 4 HOURS PRN
Status: DISCONTINUED | OUTPATIENT
Start: 2025-01-04 | End: 2025-01-04

## 2025-01-04 RX ADMIN — DOCUSATE SODIUM 100 MG: 100 CAPSULE, LIQUID FILLED ORAL at 09:19

## 2025-01-04 RX ADMIN — DOCUSATE SODIUM 100 MG: 100 CAPSULE, LIQUID FILLED ORAL at 19:33

## 2025-01-04 RX ADMIN — ACETAMINOPHEN 1000 MG: 500 TABLET ORAL at 13:10

## 2025-01-04 RX ADMIN — HYDROMORPHONE HYDROCHLORIDE 1 MG: 1 INJECTION, SOLUTION INTRAMUSCULAR; INTRAVENOUS; SUBCUTANEOUS at 01:30

## 2025-01-04 RX ADMIN — ACETAMINOPHEN 1000 MG: 500 TABLET ORAL at 19:33

## 2025-01-04 RX ADMIN — ACETAMINOPHEN 1000 MG: 500 TABLET ORAL at 04:37

## 2025-01-04 ASSESSMENT — PAIN SCALES - GENERAL
PAINLEVEL_OUTOF10: 7
PAINLEVEL_OUTOF10: 5
PAINLEVEL_OUTOF10: 3
PAINLEVEL_OUTOF10: 6

## 2025-01-04 ASSESSMENT — PAIN DESCRIPTION - DESCRIPTORS
DESCRIPTORS: CRAMPING

## 2025-01-04 ASSESSMENT — PAIN DESCRIPTION - ORIENTATION
ORIENTATION: LOWER

## 2025-01-04 ASSESSMENT — PAIN DESCRIPTION - LOCATION
LOCATION: ABDOMEN

## 2025-01-04 ASSESSMENT — PAIN - FUNCTIONAL ASSESSMENT
PAIN_FUNCTIONAL_ASSESSMENT: ACTIVITIES ARE NOT PREVENTED

## 2025-01-04 NOTE — PROGRESS NOTES
2045 Ambulated up to BR and voided 245 ml. Assisted with pericare. Pads changed. Tolerated well w/o any dizziness or weakness. Told not to get out of bed by herself. Call bell within reach.

## 2025-01-04 NOTE — PROGRESS NOTES
Progress Note    Patient: Lu Collier MRN: [id number]     YOB: 2000  Age: 24 y.o.       Subjective:     Postpartum Day: 1    The patient is feeling well. Pain is  well controlled with current medications. Urinary output is adequate. Baby is feeding via bottle without difficulty.    Objective:      Patient Vitals for the past 12 hrs:   Temp Pulse BP SpO2   01/04/25 0920 98.4 °F (36.9 °C) 66 137/81 98 %   01/04/25 0437 98.1 °F (36.7 °C) 67 130/87 100 %       General:    alert, cooperative, no distress   Lochia:  appropriate   Uterine Fundus:   firm @ umbilicus    Perineum:  well-approximated   DVT Evaluation:  No evidence of DVT seen on physical exam.  Negative Carly's sign.     Lab/Data Review:  Recent Results (from the past 24 hour(s))   CBC with Auto Differential    Collection Time: 01/04/25  5:33 AM   Result Value Ref Range    WBC 9.4 4.6 - 13.2 K/uL    RBC 3.85 (L) 4.20 - 5.30 M/uL    Hemoglobin 11.5 (L) 12.0 - 16.0 g/dL    Hematocrit 33.3 (L) 35.0 - 45.0 %    MCV 86.5 78.0 - 100.0 FL    MCH 29.9 24.0 - 34.0 PG    MCHC 34.5 31.0 - 37.0 g/dL    RDW 14.3 11.6 - 14.5 %    Platelets 184 135 - 420 K/uL    MPV 11.2 9.2 - 11.8 FL    Nucleated RBCs 0.0 0  WBC    nRBC 0.00 0.00 - 0.01 K/uL    Neutrophils % 69 40 - 73 %    Lymphocytes % 23 21 - 52 %    Monocytes % 6 3 - 10 %    Eosinophils % 1 0 - 5 %    Basophils % 0 0 - 2 %    Immature Granulocytes % 0 0.0 - 0.5 %    Neutrophils Absolute 6.4 1.8 - 8.0 K/UL    Lymphocytes Absolute 2.2 0.9 - 3.6 K/UL    Monocytes Absolute 0.6 0.05 - 1.2 K/UL    Eosinophils Absolute 0.1 0.0 - 0.4 K/UL    Basophils Absolute 0.0 0.0 - 0.1 K/UL    Immature Granulocytes Absolute 0.0 0.00 - 0.04 K/UL    Differential Type AUTOMATED           Assessment:     Delivery: spontaneous vaginal delivery    Plan:     Doing well postpartum vaginal delivery. Continue current postpartum care. Encouraged hydration, nutrition and ambulation. Plan DC home tomorrow.    Signed By:

## 2025-01-04 NOTE — DISCHARGE SUMMARY
Name: Lu Collier MRN: [id number]  SSN: [id number]    YOB: 2000  Age: 24 y.o.  Sex: female      Allergies: Pollen extract    Admit Date: 2025    Discharge Date: 2025     Admitting Physician: Yolande Shields MD     Attending Physician:  Yolande Shields MD     * Admission Diagnoses: Gestational hypertension without significant proteinuria in third trimester [O13.3]    * Discharge Diagnoses:   Information for the patient's :  Francisca Collier [[id number]]   @[id number]@     Additional Diagnoses:    Lab Results   Component Value Date/Time    ABORH O POSITIVE 2025 06:30 PM    There is no immunization history for the selected administration types on file for this patient.    * Procedures:   * No surgery found *           * Discharge Condition: Good    * Hospital Course: Normal hospital course following the delivery.    * Disposition: Home    Discharge Medications:      Medication List        START taking these medications      ibuprofen 800 MG tablet  Commonly known as: ADVIL;MOTRIN  Take 1 tablet by mouth every 8 hours            CONTINUE taking these medications      ferrous sulfate 325 (65 Fe) MG tablet  Commonly known as: IRON 325     PRENATAL PO               Where to Get Your Medications        [id number] - [place], [place] - [address] [place] [address] - P [phone] - F [phone]  [address] [place] [id number]      Phone: [phone]   ibuprofen 800 MG tablet         * Follow-up Care/Patient Instructions:  Activity: activity as tolerated  Diet: regular diet  Wound Care: keep wound clean and dry

## 2025-01-04 NOTE — ANESTHESIA POSTPROCEDURE EVALUATION
Department of Anesthesiology  Postprocedure Note    Patient: Lu Collier  MRN: [id number]  YOB: 2000  Date of evaluation: 1/4/2025    Procedure Summary       Date: 01/03/25 Room / Location:     Anesthesia Start: 0910 Anesthesia Stop: 1717    Procedure: Labor Analgesia Diagnosis:     Scheduled Providers:  Responsible Provider: Joel Arredondo MD    Anesthesia Type: epidural ASA Status: 2            Anesthesia Type: No value filed.    Larissa Phase I:      Larissa Phase II:      Anesthesia Post Evaluation    No notable events documented.

## 2025-01-05 VITALS
RESPIRATION RATE: 18 BRPM | HEIGHT: 67 IN | BODY MASS INDEX: 32.18 KG/M2 | TEMPERATURE: 98.1 F | WEIGHT: 205 LBS | SYSTOLIC BLOOD PRESSURE: 137 MMHG | HEART RATE: 69 BPM | OXYGEN SATURATION: 97 % | DIASTOLIC BLOOD PRESSURE: 83 MMHG

## 2025-01-05 PROCEDURE — 6370000000 HC RX 637 (ALT 250 FOR IP): Performed by: ADVANCED PRACTICE MIDWIFE

## 2025-01-05 RX ORDER — OXYCODONE HYDROCHLORIDE 5 MG/1
5 TABLET ORAL ONCE
Status: COMPLETED | OUTPATIENT
Start: 2025-01-05 | End: 2025-01-05

## 2025-01-05 RX ADMIN — DOCUSATE SODIUM 100 MG: 100 CAPSULE, LIQUID FILLED ORAL at 08:00

## 2025-01-05 RX ADMIN — OXYCODONE 5 MG: 5 TABLET ORAL at 00:17

## 2025-01-05 RX ADMIN — ACETAMINOPHEN 1000 MG: 500 TABLET ORAL at 07:59

## 2025-01-05 ASSESSMENT — PAIN SCALES - GENERAL
PAINLEVEL_OUTOF10: 8
PAINLEVEL_OUTOF10: 0

## 2025-01-05 ASSESSMENT — PAIN DESCRIPTION - LOCATION: LOCATION: ABDOMEN

## 2025-01-05 ASSESSMENT — PAIN - FUNCTIONAL ASSESSMENT
PAIN_FUNCTIONAL_ASSESSMENT: ACTIVITIES ARE NOT PREVENTED
PAIN_FUNCTIONAL_ASSESSMENT: ACTIVITIES ARE NOT PREVENTED

## 2025-01-05 ASSESSMENT — PAIN DESCRIPTION - ORIENTATION: ORIENTATION: LOWER

## 2025-01-05 ASSESSMENT — PAIN DESCRIPTION - DESCRIPTORS: DESCRIPTORS: CRAMPING

## 2025-01-05 NOTE — PLAN OF CARE
Problem: Pain  Goal: Verbalizes/displays adequate comfort level or baseline comfort level  1/3/2025 2224 by Mari Antoine RN  Outcome: Progressing  Flowsheets (Taken 1/3/2025 2150)  Verbalizes/displays adequate comfort level or baseline comfort level:   Encourage patient to monitor pain and request assistance   Assess pain using appropriate pain scale  1/3/2025 1838 by Patricia Dawson RN  Outcome: Progressing     Problem: Infection - Adult  Goal: Absence of infection at discharge  1/3/2025 2224 by Mari Antoine RN  Outcome: Progressing  1/3/2025 1838 by Patricia Dawson RN  Outcome: Progressing  Goal: Absence of infection during hospitalization  1/3/2025 2224 by Mari Antoine RN  Outcome: Progressing  1/3/2025 1838 by Patricia Dawson RN  Outcome: Progressing  Goal: Absence of fever/infection during anticipated neutropenic period  1/3/2025 2224 by Mari Antoine RN  Outcome: Progressing  1/3/2025 1838 by Patricia Dawson RN  Outcome: Progressing     Problem: Safety - Adult  Goal: Free from fall injury  1/3/2025 2224 by Mari Antoine RN  Outcome: Progressing  1/3/2025 1838 by Patricia Dawson RN  Outcome: Progressing     
  Problem: Pain  Goal: Verbalizes/displays adequate comfort level or baseline comfort level  1/4/2025 1155 by Jane Oneal RN  Outcome: Progressing  Flowsheets  Taken 1/4/2025 0437 by Mari Antoine RN  Verbalizes/displays adequate comfort level or baseline comfort level:   Encourage patient to monitor pain and request assistance   Assess pain using appropriate pain scale  Taken 1/4/2025 0200 by Mari Antoine RN  Verbalizes/displays adequate comfort level or baseline comfort level:   Encourage patient to monitor pain and request assistance   Assess pain using appropriate pain scale  Taken 1/4/2025 0130 by Mari Antoine RN  Verbalizes/displays adequate comfort level or baseline comfort level:   Encourage patient to monitor pain and request assistance   Assess pain using appropriate pain scale  1/3/2025 2224 by Mari Antoine RN  Outcome: Progressing  Flowsheets (Taken 1/3/2025 2150)  Verbalizes/displays adequate comfort level or baseline comfort level:   Encourage patient to monitor pain and request assistance   Assess pain using appropriate pain scale     Problem: Infection - Adult  Goal: Absence of infection at discharge  1/4/2025 1155 by Jane Oneal RN  Outcome: Progressing  1/3/2025 2224 by Mari Antoine RN  Outcome: Progressing  Goal: Absence of infection during hospitalization  1/4/2025 1155 by Jane Oneal RN  Outcome: Progressing  1/3/2025 2224 by Mari Antoine RN  Outcome: Progressing  Goal: Absence of fever/infection during anticipated neutropenic period  1/4/2025 1155 by Jane Oneal RN  Outcome: Progressing  1/3/2025 2224 by Mari Antoine RN  Outcome: Progressing     Problem: Safety - Adult  Goal: Free from fall injury  1/4/2025 1155 by Jane Oneal RN  Outcome: Progressing  1/3/2025 2224 by Mari Antoine RN  Outcome: Progressing     
  Problem: Pain  Goal: Verbalizes/displays adequate comfort level or baseline comfort level  1/5/2025 0924 by Lyndsay Moore RN  Outcome: Progressing  Flowsheets  Taken 1/5/2025 0746 by Lyndsay Moore RN  Verbalizes/displays adequate comfort level or baseline comfort level:   Encourage patient to monitor pain and request assistance   Assess pain using appropriate pain scale  Taken 1/5/2025 0315 by Mari Antoine RN  Verbalizes/displays adequate comfort level or baseline comfort level:   Encourage patient to monitor pain and request assistance   Assess pain using appropriate pain scale  Taken 1/5/2025 0047 by Mari Antoine RN  Verbalizes/displays adequate comfort level or baseline comfort level:   Encourage patient to monitor pain and request assistance   Assess pain using appropriate pain scale  Taken 1/5/2025 0017 by Mari Antoine RN  Verbalizes/displays adequate comfort level or baseline comfort level:   Encourage patient to monitor pain and request assistance   Assess pain using appropriate pain scale  1/4/2025 2133 by Mari Antoine RN  Outcome: Progressing  Flowsheets  Taken 1/4/2025 2030  Verbalizes/displays adequate comfort level or baseline comfort level:   Assess pain using appropriate pain scale   Encourage patient to monitor pain and request assistance  Taken 1/4/2025 1933  Verbalizes/displays adequate comfort level or baseline comfort level:   Assess pain using appropriate pain scale   Encourage patient to monitor pain and request assistance     Problem: Infection - Adult  Goal: Absence of infection at discharge  1/5/2025 0924 by Lyndsay Moore RN  Outcome: Progressing  1/4/2025 2133 by Mari Antoine RN  Outcome: Progressing  Goal: Absence of infection during hospitalization  1/5/2025 0924 by Lyndsay Moore RN  Outcome: Progressing  1/4/2025 2133 by Mari Antoine RN  Outcome: Progressing  Goal: Absence of fever/infection during anticipated neutropenic period  1/5/2025 0924 by 
  Problem: Pain  Goal: Verbalizes/displays adequate comfort level or baseline comfort level  Outcome: Progressing     Problem: Vaginal Birth or  Section  Goal: Fetal and maternal status remain reassuring during the birth process  Description:  Birth OB-Pregnancy care plan goal which identifies if the fetal and maternal status remain reassuring during the birth process  Outcome: Completed     Problem: Infection - Adult  Goal: Absence of infection at discharge  Outcome: Progressing  Goal: Absence of infection during hospitalization  Outcome: Progressing  Goal: Absence of fever/infection during anticipated neutropenic period  Outcome: Progressing     Problem: Safety - Adult  Goal: Free from fall injury  Outcome: Progressing     
  Problem: Pain  Goal: Verbalizes/displays adequate comfort level or baseline comfort level  Outcome: Progressing     Problem: Vaginal Birth or  Section  Goal: Fetal and maternal status remain reassuring during the birth process  Description:  Birth OB-Pregnancy care plan goal which identifies if the fetal and maternal status remain reassuring during the birth process  Outcome: Progressing     Problem: Infection - Adult  Goal: Absence of infection at discharge  Outcome: Progressing  Goal: Absence of infection during hospitalization  Outcome: Progressing  Goal: Absence of fever/infection during anticipated neutropenic period  Outcome: Progressing     
by Jane Oneal, RN  Outcome: Progressing

## (undated) DEVICE — AIR SHEET,LAT,COMFORT GLIDE, BLEND 40X80: Brand: MEDLINE

## (undated) DEVICE — SHEAR HARMONIC 5MMX45CM -- ACE 7+

## (undated) DEVICE — SUTURE ETHLN SZ 3-0 L30IN NONABSORBABLE BLK FSL L30MM 3/8 1671H

## (undated) DEVICE — KIT SUTURING DEVICE M-CLOSE

## (undated) DEVICE — ENDOCUT SCISSOR TIP, DISPOSABLE: Brand: RENEW

## (undated) DEVICE — SPONGE DRAIN NONWOVEN 4X4IN -- 2/PK

## (undated) DEVICE — SUTURE ETHIB EXCL BR GRN TAPR PT 2-0 30 X563H X563H

## (undated) DEVICE — AGENT HEMSTAT W6XL9IN OXIDIZED REGENERATED CELOS ABSRB FOR

## (undated) DEVICE — SYRINGE,TOOMEY,IRRIGATION,70CC,STERILE: Brand: MEDLINE

## (undated) DEVICE — Device

## (undated) DEVICE — VISUALIZATION SYSTEM: Brand: CLEARIFY

## (undated) DEVICE — GLOVE ORANGE PI 8   MSG9080

## (undated) DEVICE — SOL IRRIGATION INJ NACL 0.9% 500ML BTL

## (undated) DEVICE — DISPOSABLE SUCTION/IRRIGATOR TUBE SET WITH TIP: Brand: AHTO

## (undated) DEVICE — TROCAR LAP L100MM DIA5MM BLDELSS W/ STBL SL ENDOPATH XCEL

## (undated) DEVICE — BARIATRIC: Brand: MEDLINE INDUSTRIES, INC.

## (undated) DEVICE — FORCEPS BX OVL CUP SERR DISP CAP L 240CM RAD JAW 4

## (undated) DEVICE — RELOAD STPL H4.1X2MM DIA60MM THCK TISS GRN 6 ROW PWR GST B

## (undated) DEVICE — SUT MONOCRYL PLUS UD 4-0 --

## (undated) DEVICE — TUBING, SUCTION, 1/4" X 12', STRAIGHT: Brand: MEDLINE

## (undated) DEVICE — APPLICATOR LAP 35 CM 2 RIGID VISTASEAL

## (undated) DEVICE — SOLUTION LACTATED RINGERS INJECTION USP

## (undated) DEVICE — [HIGH FLOW INSUFFLATOR,  DO NOT USE IF PACKAGE IS DAMAGED,  KEEP DRY,  KEEP AWAY FROM SUNLIGHT,  PROTECT FROM HEAT AND RADIOACTIVE SOURCES.]: Brand: PNEUMOSURE

## (undated) DEVICE — NEEDLE INSUF L150MM DIA2MM DISP FOR PNEUMOPERI ENDOPATH

## (undated) DEVICE — SOLUTION SURG PREP 26 CC PURPREP

## (undated) DEVICE — GARMENT,MEDLINE,DVT,INT,CALF,MED, GEN2: Brand: MEDLINE

## (undated) DEVICE — SEALANT TISS 10 CC FOR HUM FIBRIN VISTASEAL

## (undated) DEVICE — APPLIER CLP L SHFT DIA12MM 20 ROT MULT LIGACLP

## (undated) DEVICE — VISIGI 3D®  CALIBRATION SYSTEM  SIZE 36FR STD W/ BULB: Brand: BOEHRINGER® VISIGI 3D™ SLEEVE GASTRECTOMY CALIBRATION SYSTEM, SIZE 36FR W/BULB

## (undated) DEVICE — RELOAD STPL H1.8-3.8MM REG THCK TISS G 6 ROW GRIPPING SURF

## (undated) DEVICE — GLOVE ORANGE PI 7 1/2   MSG9075

## (undated) DEVICE — STRIP SKIN CLSR W1XL5IN NYL REINF CURAD

## (undated) DEVICE — TROCAR RMFG CANN S-SLV 5X100MM --

## (undated) DEVICE — STAPLER SKIN L440MM 32MM LNG 12 FIRING B FRM PWR + GRIPPING

## (undated) DEVICE — TROCAR RMFG ENDOPATH 12X100M --

## (undated) DEVICE — SUTURE PDS II SZ 0 L27IN ABSRB VLT L26MM CT-2 1/2 CIR Z334H

## (undated) DEVICE — RESERVOIR,SUCTION,100CC,SILICONE: Brand: MEDLINE

## (undated) DEVICE — TROCAR ENDOSCP L100MM DIA15MM BLDELSS STBL SL ENDOPATH XCEL

## (undated) DEVICE — TROCAR ENDOSCP BLDELSS 12X100 MM W/ HNDL STBL SL OPT TIP

## (undated) DEVICE — STAPLER SKIN LN REINF 60 MM ECHELON ENDOPATH